# Patient Record
Sex: FEMALE | Race: WHITE
[De-identification: names, ages, dates, MRNs, and addresses within clinical notes are randomized per-mention and may not be internally consistent; named-entity substitution may affect disease eponyms.]

---

## 2018-05-15 ENCOUNTER — HOSPITAL ENCOUNTER (INPATIENT)
Dept: HOSPITAL 36 - ER | Age: 58
LOS: 27 days | Discharge: NURSING, REHAB OR LONG-TERM CARE | DRG: 885 | End: 2018-06-11
Attending: PSYCHIATRY & NEUROLOGY | Admitting: PSYCHIATRY & NEUROLOGY
Payer: MEDICARE

## 2018-05-15 VITALS — DIASTOLIC BLOOD PRESSURE: 52 MMHG | SYSTOLIC BLOOD PRESSURE: 136 MMHG

## 2018-05-15 DIAGNOSIS — E78.5: ICD-10-CM

## 2018-05-15 DIAGNOSIS — B37.9: ICD-10-CM

## 2018-05-15 DIAGNOSIS — F29: ICD-10-CM

## 2018-05-15 DIAGNOSIS — F25.0: Primary | ICD-10-CM

## 2018-05-15 DIAGNOSIS — E83.42: ICD-10-CM

## 2018-05-15 DIAGNOSIS — E88.81: ICD-10-CM

## 2018-05-15 DIAGNOSIS — R73.9: ICD-10-CM

## 2018-05-15 DIAGNOSIS — I12.0: ICD-10-CM

## 2018-05-15 DIAGNOSIS — I25.10: ICD-10-CM

## 2018-05-15 DIAGNOSIS — N18.6: ICD-10-CM

## 2018-05-15 DIAGNOSIS — E66.9: ICD-10-CM

## 2018-05-15 DIAGNOSIS — M19.90: ICD-10-CM

## 2018-05-15 LAB
ALBUMIN SERPL-MCNC: 3.2 GM/DL (ref 3.7–5.3)
ALBUMIN/GLOB SERPL: 1.2 {RATIO} (ref 1–1.8)
ALP SERPL-CCNC: 57 U/L (ref 34–104)
ALT SERPL-CCNC: 21 U/L (ref 7–52)
AMPHET UR-MCNC: NEGATIVE NG/ML
ANION GAP SERPL CALC-SCNC: 8.5 MMOL/L (ref 7–16)
APAP SERPL-MCNC: < 10 UG/ML (ref 10–30)
APPEARANCE UR: (no result)
AST SERPL-CCNC: 22 U/L (ref 13–39)
BACTERIA #/AREA URNS HPF: (no result) /HPF
BARBITURATES UR-MCNC: NEGATIVE UG/ML
BASOPHILS # BLD AUTO: 0 TH/CUMM (ref 0–0.2)
BASOPHILS NFR BLD AUTO: 0.3 % (ref 0–2)
BENZODIAZEPINES PNL UR: POSITIVE
BILIRUB SERPL-MCNC: 0.6 MG/DL (ref 0.3–1)
BILIRUB UR-MCNC: NEGATIVE MG/DL
BUN SERPL-MCNC: 12 MG/DL (ref 7–25)
CALCIUM SERPL-MCNC: 9 MG/DL (ref 8.6–10.3)
CANNABINOIDS SERPL QL CFM: NEGATIVE
CHLORIDE SERPL-SCNC: 101 MEQ/L (ref 98–107)
CHOLEST SERPL-MCNC: 122 MG/DL (ref ?–200)
CO2 SERPL-SCNC: 30.1 MEQ/L (ref 21–31)
COCAINE METAB.OTHER UR-MCNC: NEGATIVE NG/ML
COLOR UR: YELLOW
CREAT SERPL-MCNC: 0.5 MG/DL (ref 0.6–1.2)
EOSINOPHIL # BLD AUTO: 0.2 TH/CMM (ref 0.1–0.4)
EOSINOPHIL NFR BLD AUTO: 3.5 % (ref 0–5)
EPI CELLS URNS QL MICRO: (no result) /LPF
ERYTHROCYTE [DISTWIDTH] IN BLOOD BY AUTOMATED COUNT: 15 % (ref 11.5–20)
GLOBULIN SER-MCNC: 2.6 GM/DL
GLUCOSE SERPL-MCNC: 118 MG/DL (ref 70–105)
GLUCOSE UR STRIP-MCNC: NEGATIVE MG/DL
HBA1C MFR BLD: 5.4 % (ref 4–6)
HCG SERPL QL: NEGATIVE
HCT VFR BLD CALC: 39.5 % (ref 41–60)
HDLC SERPL-MCNC: 52 MG/DL (ref 23–92)
HGB BLD-MCNC: 13.3 GM/DL (ref 12–16)
HGB BLD-MCNC: 15 G/DL (ref 4–35)
KETONES UR STRIP-MCNC: NEGATIVE MG/DL
LEUKOCYTE ESTERASE UR-ACNC: NEGATIVE
LYMPHOCYTE AB SER FC-ACNC: 1.5 TH/CMM (ref 1.5–3)
LYMPHOCYTES NFR BLD AUTO: 24.1 % (ref 20–50)
MCH RBC QN AUTO: 31.4 PG (ref 27–31)
MCHC RBC AUTO-ENTMCNC: 33.7 PG (ref 28–36)
MCV RBC AUTO: 93 FL (ref 81–100)
METHADONE UR CFM-MCNC: NEGATIVE NG/ML
METHAMPHET UR QL: NEGATIVE
MICRO URNS: YES
MONOCYTES # BLD AUTO: 0.9 TH/CMM (ref 0.3–1)
MONOCYTES NFR BLD AUTO: 13.5 % (ref 2–10)
NEUTROPHILS # BLD: 3.7 TH/CMM (ref 1.8–8)
NEUTROPHILS NFR BLD AUTO: 58.6 % (ref 40–80)
NITRITE UR QL STRIP: NEGATIVE
OPIATES UR QL: NEGATIVE
PCP UR-MCNC: NEGATIVE UG/L
PH UR STRIP: 6 [PH] (ref 4.6–8)
PLATELET # BLD: 209 TH/CMM (ref 150–400)
PMV BLD AUTO: 8 FL
POTASSIUM SERPL-SCNC: 3.6 MEQ/L (ref 3.5–5.1)
PROT UR STRIP-MCNC: NEGATIVE MG/DL
RBC # BLD AUTO: 4.25 MIL/CMM (ref 3.8–5.1)
RBC # UR STRIP: NEGATIVE /UL
RBC #/AREA URNS HPF: (no result) /HPF (ref 0–5)
SALICYLATES SERPL-MCNC: < 25 MG/L (ref 30–100)
SODIUM SERPL-SCNC: 136 MEQ/L (ref 136–145)
SP GR UR STRIP: 1.02 (ref 1–1.03)
TRICYCLICS UR QL: NEGATIVE
TRIGL SERPL-MCNC: 82 MG/DL (ref ?–150)
URINALYSIS COMPLETE PNL UR: (no result)
UROBILINOGEN UR STRIP-ACNC: 1 E.U./DL (ref 0.2–1)
WBC # BLD AUTO: 6.3 TH/CMM (ref 4.8–10.8)
WBC #/AREA URNS HPF: (no result) /HPF (ref 0–5)

## 2018-05-15 PROCEDURE — G0410 GRP PSYCH PARTIAL HOSP 45-50: HCPCS

## 2018-05-15 PROCEDURE — Z7610: HCPCS

## 2018-05-15 NOTE — ED PHYSICIAN CHART
ED Chief Complaint/HPI





- Patient Information


Date Seen:: 05/15/18


Time Seen:: 14:10


Chief Complaint:: Agitation


History of Present Illness:: 





onset x 3 days of agitation and aggressive behavior; no report of trauma, SIs, 

AMS, H/As, S/T, neck pain, C/P, SOB, Abd. Pain, A/N/V/D/C, fever, chills, or 

urinary s/s


Allergies:: 


 Allergies











Allergy/AdvReac Type Severity Reaction Status Date / Time


 


lorazepam Allergy   Verified 05/15/18 14:09











Historian:: Patient, EMS


Review:: Nurse's Note Reviewed, Old Chart Reviewed, EMS run form Reviewed





ED Review of Systems





- Review of Systems


General/Constitutional: No fever, No chills, No weight loss, No weakness, No 

diaphoresis, No edema, No loss of appetite


Skin: No skin lesions, No rash, No bruising


Head: No headache, No light-headedness


Eyes: No loss of vision, No pain, No diplopia


ENT: No earache, No nasal drainage, No sore throat, No tinnitus


Neck: No neck pain, No swelling, No thyromegaly, No stiffness, No mass noted


Cardio Vascular: No chest pain, No palpitations, No PND, No orthopnea, No edema


Pulmonary: No SOB, No cough, No sputum, No wheezing


GI: No nausea, No vomiting, No diarrhea, No pain, No melena, No hematochezia, 

No constipation, No hematemesis


G/U: No dysuria, No frequency, No hematuria, No nacturia


Ob/Gyn: No vaginal discharge, No abnormal vaginal bleed, No contraction


Musculoskeletal: No bone or joint pain, No back pain, No muscle pain


Endocrine: No polyuria, No polydipsia


Psychiatric: Prior psych history, No depression, Anxiety, No suicidal ideation, 

No homicidal ideation, Auditory hallucination, No visual hallucination


Hematopoietic: No bruising, No lymphadenopathy


Allergic/Immuno: No urticaria, No angioedema


Neurological: No syncope, No focal symptoms, No weakness, No paresthesia, No 

headache, No seizure, No dizziness, No confusion, No vertigo





ED Past Medical History





- Past Medical History


Obtainable: Yes


Past Medical History: HTN, CAD, Dyslipidemia, ESRD, Arthritis


Family History: HTN


Social History: Non Smoker, No Alcohol, No Drug Use, Single, Care Facility


Surgical History: None


Psychiatricy History: Schizophrenia, Bipolar


Medication: Reviewed





Family Medical History





- Family Member


  ** Mother


History Unknown: Yes





ED Physical Exam





- Physical Examination


General/Constitutional: Awake, Well-developed, well-nourished, Alert, No 

distress, GCS 15, Non-toxic appearing, Ambulatory


Head: Atraumatic


Eyes: Lids, conjuctiva normal, PERRL, EOMI


Skin: Nl inspection, No rash, No skin lesions, No ecchymosis, Well hydrated, No 

lymphadenopathy


ENMT: External ears, nose nl, TM canals nl, Nasal exam nl, Lips, teeth, gums nl

, Oropharynx nl, Tonsils nl


Neck: Nontender, Full ROM w/o pain, No JVD, No nuchal rigidity, No bruit, No 

mass, No stridor


Respiratory: Nl effort/Exclusion, Clear to Auscultation, No Wheeze/Rhonchi/Rales


Cardio Vascular: No murmur, gallop, rubs, NL S1 S2, Carotid/Femoral/Distal 

pulses equal bilaterally


Other Cardio Vascular comments:: 





Irregular Irregular Rhythm


GI: No tenderness/rebounding/guarding, No organomegaly, No hernia, Normal BS's, 

Nondistended, No mass/bruits, No McBurney tenderness


: No CVA tenderness


Extremities: No tenderness or effusion, Full ROM, normal strength in all 

extremities, No edema, Normal digits & nails


Neuro/Psych: Alert/oriented, DTR's symmetric, Normal sensory exam, Normal motor 

strength, Judgement/insight normal, Mood normal, Normal gait, No focal deficits


Misc: Normal back, No paraspinal tenderness





ED Labs/Radiology/EKG Results





- Lab Results


Comments:: 





unremarkable





- EKG Interpretations


EKG Time:: 14:32


Rate & Rhythm: 72; Atrial Flutter/Fibrillation


Comments:: 





RBBB; non-specific st-t changes





ED Septic Shock





- .


Is Septic Shock (SBP<90, OR Lactate>4 mmol\L) present?: No





ED Reassessment (Disposition)





- Reassessment


Reassessment Condition:: Improved





- Diagnosis


Diagnosis:: 





Dx: Agitation; Medical Clearance; Psychosis; Bipolar Disorder





- Aftercare/Follow up Instructions


Aftercare/Follow-Up Instructions:: Counseled pt regarding lab results/diagnosis 

& need follow up, Counseled pt & family regarding lab results/diagnosis & need 

follow up





- Patient Disposition


Discharge/Transfer:: Acute Care w/in this hosp


Admitted to:: [facility]


Condition at Disposition:: Stable, Improved

## 2018-05-16 RX ADMIN — NYSTATIN SCH UNITS: 100000 POWDER TOPICAL at 17:55
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
## 2018-05-17 NOTE — GENERAL PROGRESS NOTE
Subjective





- Review of Systems


Subjective: 





Patient is seen and examined.


No new events.





Objective





- Results


Result Diagrams: 


 05/15/18 14:43





 05/15/18 14:43


Recent Labs: 


 Laboratory Last Values











WBC  6.3 Th/cmm (4.8-10.8)   05/15/18  14:43    


 


RBC  4.25 Mil/cmm (3.80-5.10)   05/15/18  14:43    


 


Hgb  13.3 gm/dL (12-16)   05/15/18  14:43    


 


Hct  39.5 % (41.0-60)  L  05/15/18  14:43    


 


MCV  93.0 fl ()   05/15/18  14:43    


 


MCH  31.4 pg (27.0-31.0)  H  05/15/18  14:43    


 


MCHC Differential  33.7 pg (28.0-36.0)   05/15/18  14:43    


 


RDW  15.0 % (11.5-20.0)   05/15/18  14:43    


 


Plt Count  209 Th/cmm (150-400)   05/15/18  14:43    


 


MPV  8.0 fl  05/15/18  14:43    


 


Neutrophils %  58.6 % (40.0-80.0)   05/15/18  14:43    


 


Lymphocytes %  24.1 % (20.0-50.0)   05/15/18  14:43    


 


Monocytes %  13.5 % (2.0-10.0)  H  05/15/18  14:43    


 


Eosinophils %  3.5 % (0.0-5.0)   05/15/18  14:43    


 


Basophils %  0.3 % (0.0-2.0)   05/15/18  14:43    


 


Sodium  136 mEq/L (136-145)   05/15/18  14:43    


 


Potassium  3.6 mEq/L (3.5-5.1)   05/15/18  14:43    


 


Chloride  101 mEq/L ()   05/15/18  14:43    


 


Carbon Dioxide  30.1 mEq/L (21.0-31.0)   05/15/18  14:43    


 


Anion Gap  8.5  (7.0-16.0)   05/15/18  14:43    


 


BUN  12 mg/dL (7-25)   05/15/18  14:43    


 


Creatinine  0.5 mg/dL (0.6-1.2)  L  05/15/18  14:43    


 


Est GFR ( Amer)  > 60.0 ml/min (>90)   05/15/18  14:43    


 


Est GFR (Non-Af Amer)  > 60.0 ml/min  05/15/18  14:43    


 


BUN/Creatinine Ratio  24.0   05/15/18  14:43    


 


Glucose  118 mg/dL ()  H  05/15/18  14:43    


 


Hemoglobin A1c %  5.4 % (4.0-6.0)   05/15/18  14:43    


 


Calcium  9.0 mg/dL (8.6-10.3)   05/15/18  14:43    


 


Total Bilirubin  0.6 mg/dL (0.3-1.0)   05/15/18  14:43    


 


AST  22 U/L (13-39)   05/15/18  14:43    


 


ALT  21 U/L (7-52)   05/15/18  14:43    


 


Alkaline Phosphatase  57 U/L ()   05/15/18  14:43    


 


Total Protein  5.8 gm/dL (6.0-8.3)  L  05/15/18  14:43    


 


Albumin  3.2 gm/dL (3.7-5.3)  L  05/15/18  14:43    


 


Globulin  2.6 gm/dL  05/15/18  14:43    


 


Albumin/Globulin Ratio  1.2  (1.0-1.8)   05/15/18  14:43    


 


Triglycerides  82 mg/dL (<150)   05/15/18  14:43    


 


Cholesterol  122 mg/dL (<200)   05/15/18  14:43    


 


LDL Cholesterol Direct  52 mg/dL ()  L  05/15/18  14:43    


 


HDL Cholesterol  52 mg/dL (23-92)   05/15/18  14:43    


 


TSH  1.25 uIU/ml (0.34-5.60)   05/15/18  14:43    


 


Serum Pregnancy, Qual  NEGATIVE  (NEGATIVE)   05/15/18  14:43    


 


Urine Source  CLEAN C   05/15/18  15:40    


 


Urine Color  YELLOW   05/15/18  15:40    


 


Urine Clarity  SLIGHTLY HAZY  (CLEAR)   05/15/18  15:40    


 


Urine pH  6.0  (4.6 - 8.0)   05/15/18  15:40    


 


Ur Specific Gravity  1.025  (1.005-1.030)   05/15/18  15:40    


 


Urine Protein  NEGATIVE mg/dL (NEGATIVE)   05/15/18  15:40    


 


Urine Glucose (UA)  NEGATIVE mg/dL (NEGATIVE)   05/15/18  15:40    


 


Urine Ketones  NEGATIVE mg/dL (NEGATIVE)   05/15/18  15:40    


 


Urine Blood  NEGATIVE  (NEGATIVE)   05/15/18  15:40    


 


Urine Nitrate  NEGATIVE  (NEGATIVE)   05/15/18  15:40    


 


Urine Bilirubin  NEGATIVE  (NEGATIVE)   05/15/18  15:40    


 


Urine Urobilinogen  1.0 E.U./dL (0.2 - 1.0)   05/15/18  15:40    


 


Ur Leukocyte Esterase  NEGATIVE  (NEGATIVE)   05/15/18  15:40    


 


Urine RBC  0-2 /hpf (0-5)   05/15/18  15:40    


 


Urine WBC  0-2 /hpf (0-5)   05/15/18  15:40    


 


Ur Epithelial Cells  MANY /lpf (FEW)   05/15/18  15:40    


 


Urine Bacteria  NONE SEEN /hpf (NONE SEEN)   05/15/18  15:40    


 


Salicylates  < 25.0 mg/L (30.0-100.0)  L  05/15/18  14:43    


 


Urine Opiates Screen  NEGATIVE  (NEGATIVE)   05/15/18  15:40    


 


Urine Methadone Screen  NEGATIVE  (NEGATIVE)   05/15/18  15:40    


 


Acetaminophen  < 10.0 ug/mL (10.0-30.0)  L  05/15/18  14:43    


 


Ur Barbiturates Screen  NEGATIVE  (NEGATIVE)   05/15/18  15:40    


 


Valproic Acid  15.1 ug/mL (50.0-100.0)  L  05/16/18  09:20    


 


Ur Tricyclics Screen  NEGATIVE  (NEGATIVE)   05/15/18  15:40    


 


Ur Phencyclidine Scrn  NEGATIVE  (NEGATIVE)   05/15/18  15:40    


 


Amphetamines Screen  NEGATIVE  (NEGATIVE)   05/15/18  15:40    


 


U Methamphetamines Scrn  NEGATIVE  (NEGATIVE)   05/15/18  15:40    


 


U Benzodiazepines Scrn  POSITIVE  (NEGATIVE)  H  05/15/18  15:40    


 


U Cocaine Metab Screen  NEGATIVE  (NEGATIVE)   05/15/18  15:40    


 


U Cannabinoids Screen  NEGATIVE  (NEGATIVE)   05/15/18  15:40    


 


Ethyl Alcohol  < 10 mg/dL (0-10)   05/15/18  14:43    














- Physical Exam


Vitals and I&O: 


 Vital Signs











Temp  97.5 F   05/16/18 15:55


 


Pulse  79   05/17/18 08:41


 


Resp  20   05/16/18 20:00


 


BP  155/77   05/17/18 08:41


 


Pulse Ox  95   05/16/18 15:55








 Intake & Output











 05/16/18 05/17/18 05/17/18





 18:59 06:59 18:59


 


Other:   


 


  Stool Characteristics Formed Formed 





 Brown Brown 











Active Medications: 


Current Medications





Acetaminophen (Tylenol)  650 mg PO Q4HR PRN


   PRN Reason: Mild Pain / Temp above 100


   Stop: 07/14/18 16:41


   Last Admin: 05/16/18 09:56 Dose:  650 mg


Al Hydrox/Mg Hydrox/Simethicone (Maalox)  30 ml PO Q4HR PRN


   PRN Reason: GI DISTRESS


   Stop: 07/14/18 16:41


Carvedilol (Coreg)  6.25 mg PO BID [facility]


   Stop: 07/14/18 16:59


   Last Admin: 05/17/18 08:41 Dose:  6.25 mg


Docusate Sodium (Colace)  100 mg PO DAILY [facility]


   Stop: 07/15/18 08:59


   Last Admin: 05/17/18 08:40 Dose:  100 mg


Furosemide (Lasix)  20 mg PO DAILY [facility]


   Stop: 07/15/18 08:59


   Last Admin: 05/17/18 08:40 Dose:  20 mg


Lactulose (Cephulac)  10 gm PO DAILY PRN


   PRN Reason: Constipation


   Stop: 07/14/18 16:59


Magnesium Hydroxide (Milk Of Magnesia)  30 ml PO HS PRN


   PRN Reason: Constipation


Miscellaneous (Paliperidone Palmitate [Invega Sustenna])  234 mg PO [id number] [facility]


   Stop: 07/14/18 16:44


Multivitamins/Vitamin C (Theragran)  1 tab PO DAILY [facility]


   Stop: 07/15/18 08:59


   Last Admin: 05/17/18 08:41 Dose:  1 tab


Nystatin (Nystop)  100,000 units TP BID [facility]


   Stop: 07/15/18 16:59


   Last Admin: 05/16/18 17:55 Dose:  100,000 units


Quetiapine Fumarate (Seroquel)  50 mg PO BID [facility]


   PRN Reason: Protocol


   Stop: 07/16/18 08:59


   Last Admin: 05/17/18 08:41 Dose:  50 mg


Quetiapine Fumarate (Seroquel)  100 mg PO HS [facility]


   PRN Reason: Protocol


   Stop: 07/16/18 20:59


Valproate Sodium (Depakene)  500 mg PO BID [facility]


   Stop: 07/16/18 06:42


   Last Admin: 05/17/18 08:41 Dose:  500 mg


Zolpidem Tartrate (Ambien)  5 mg PO HS PRN


   PRN Reason: Insomnia


   Stop: 07/14/18 16:41


   Last Admin: 05/16/18 01:24 Dose:  5 mg








General: Alert, No acute distress


HEENT: Atraumatic, PERRLA, EOMI


Neck: Supple, JVD


Cardiovascular: Regular rate, Normal S1, Normal S2


Lungs: Clear to auscultation


Abdomen: Bowel sounds, Soft


Extremities: Other (No edema+)


Skin: Other (Erythematous rash under the breast.)


Psych/Mental Status: Other (labile.)





Assessment/Plan





- Assessment


Assessment: 





Cutaneous candidiasis


Hypertension.


CAD


Hyperlipedemia.


Psych disorder


DJD


Fall risk.





- Plan


Plan: 





Nystatin powder


Monitor vitals and lab


Monitor behavior


Fall precautions.


general nursing care


Psych meds


Psych follow up.


Symptoms management.


Continue current care.
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
## 2018-05-17 NOTE — PROGRESS NOTES
DATE:  



SUBJECTIVE:  Chart reviewed and the patient interviewed.  Also discussed the

patient's condition with the staff and reviewed records and labs.  The patient

continued to be extremely agitated and in angry and irritable mood.  The patient

has been yelling and screaming, and verbally abusive to staff.  The patient also

has been taking off her clothes and yelling at staff to come and help her out. 

She also did not sleep last night, almost all night.  Also, during interview,

the patient kept yelling and screaming and asking for "help."  She also has

been having severe mood swings and she was not able to carry on coherent

conversation.



ASSESSMENT:  The patient is still severely psychotic and agitated.



TREATMENT PLAN:  Continue to monitor her behavior and her condition closely. 

Also, we will start the patient on Seroquel 50 mg twice a day and 100 mg at

bedtime.  Also, we will increase Depakote to 500 mg twice a day and will

continue to follow up closely.





DD: 05/17/2018 07:09

DT: 05/17/2018 21:24

[facility]# [id number]  [id number]

## 2018-05-18 RX ADMIN — NYSTATIN SCH: 100000 POWDER TOPICAL at 08:35

## 2018-05-18 RX ADMIN — NYSTATIN SCH: 100000 POWDER TOPICAL at 17:19

## 2018-05-18 NOTE — GENERAL PROGRESS NOTE
Subjective





- Review of Systems


Subjective: 





Patient is seen and examined.


No new events.


Discussed with staff Re; their concerns.





Objective





- Results


Result Diagrams: 


 05/15/18 14:43





 05/15/18 14:43


Recent Labs: 


 Laboratory Last Values











WBC  6.3 Th/cmm (4.8-10.8)   05/15/18  14:43    


 


RBC  4.25 Mil/cmm (3.80-5.10)   05/15/18  14:43    


 


Hgb  13.3 gm/dL (12-16)   05/15/18  14:43    


 


Hct  39.5 % (41.0-60)  L  05/15/18  14:43    


 


MCV  93.0 fl ()   05/15/18  14:43    


 


MCH  31.4 pg (27.0-31.0)  H  05/15/18  14:43    


 


MCHC Differential  33.7 pg (28.0-36.0)   05/15/18  14:43    


 


RDW  15.0 % (11.5-20.0)   05/15/18  14:43    


 


Plt Count  209 Th/cmm (150-400)   05/15/18  14:43    


 


MPV  8.0 fl  05/15/18  14:43    


 


Neutrophils %  58.6 % (40.0-80.0)   05/15/18  14:43    


 


Lymphocytes %  24.1 % (20.0-50.0)   05/15/18  14:43    


 


Monocytes %  13.5 % (2.0-10.0)  H  05/15/18  14:43    


 


Eosinophils %  3.5 % (0.0-5.0)   05/15/18  14:43    


 


Basophils %  0.3 % (0.0-2.0)   05/15/18  14:43    


 


Sodium  136 mEq/L (136-145)   05/15/18  14:43    


 


Potassium  3.6 mEq/L (3.5-5.1)   05/15/18  14:43    


 


Chloride  101 mEq/L ()   05/15/18  14:43    


 


Carbon Dioxide  30.1 mEq/L (21.0-31.0)   05/15/18  14:43    


 


Anion Gap  8.5  (7.0-16.0)   05/15/18  14:43    


 


BUN  12 mg/dL (7-25)   05/15/18  14:43    


 


Creatinine  0.5 mg/dL (0.6-1.2)  L  05/15/18  14:43    


 


Est GFR ( Amer)  > 60.0 ml/min (>90)   05/15/18  14:43    


 


Est GFR (Non-Af Amer)  > 60.0 ml/min  05/15/18  14:43    


 


BUN/Creatinine Ratio  24.0   05/15/18  14:43    


 


Glucose  118 mg/dL ()  H  05/15/18  14:43    


 


Hemoglobin A1c %  5.4 % (4.0-6.0)   05/15/18  14:43    


 


Calcium  9.0 mg/dL (8.6-10.3)   05/15/18  14:43    


 


Total Bilirubin  0.6 mg/dL (0.3-1.0)   05/15/18  14:43    


 


AST  22 U/L (13-39)   05/15/18  14:43    


 


ALT  21 U/L (7-52)   05/15/18  14:43    


 


Alkaline Phosphatase  57 U/L ()   05/15/18  14:43    


 


Total Protein  5.8 gm/dL (6.0-8.3)  L  05/15/18  14:43    


 


Albumin  3.2 gm/dL (3.7-5.3)  L  05/15/18  14:43    


 


Globulin  2.6 gm/dL  05/15/18  14:43    


 


Albumin/Globulin Ratio  1.2  (1.0-1.8)   05/15/18  14:43    


 


Triglycerides  82 mg/dL (<150)   05/15/18  14:43    


 


Cholesterol  122 mg/dL (<200)   05/15/18  14:43    


 


LDL Cholesterol Direct  52 mg/dL ()  L  05/15/18  14:43    


 


HDL Cholesterol  52 mg/dL (23-92)   05/15/18  14:43    


 


TSH  1.25 uIU/ml (0.34-5.60)   05/15/18  14:43    


 


Serum Pregnancy, Qual  NEGATIVE  (NEGATIVE)   05/15/18  14:43    


 


Urine Source  CLEAN C   05/15/18  15:40    


 


Urine Color  YELLOW   05/15/18  15:40    


 


Urine Clarity  SLIGHTLY HAZY  (CLEAR)   05/15/18  15:40    


 


Urine pH  6.0  (4.6 - 8.0)   05/15/18  15:40    


 


Ur Specific Gravity  1.025  (1.005-1.030)   05/15/18  15:40    


 


Urine Protein  NEGATIVE mg/dL (NEGATIVE)   05/15/18  15:40    


 


Urine Glucose (UA)  NEGATIVE mg/dL (NEGATIVE)   05/15/18  15:40    


 


Urine Ketones  NEGATIVE mg/dL (NEGATIVE)   05/15/18  15:40    


 


Urine Blood  NEGATIVE  (NEGATIVE)   05/15/18  15:40    


 


Urine Nitrate  NEGATIVE  (NEGATIVE)   05/15/18  15:40    


 


Urine Bilirubin  NEGATIVE  (NEGATIVE)   05/15/18  15:40    


 


Urine Urobilinogen  1.0 E.U./dL (0.2 - 1.0)   05/15/18  15:40    


 


Ur Leukocyte Esterase  NEGATIVE  (NEGATIVE)   05/15/18  15:40    


 


Urine RBC  0-2 /hpf (0-5)   05/15/18  15:40    


 


Urine WBC  0-2 /hpf (0-5)   05/15/18  15:40    


 


Ur Epithelial Cells  MANY /lpf (FEW)   05/15/18  15:40    


 


Urine Bacteria  NONE SEEN /hpf (NONE SEEN)   05/15/18  15:40    


 


Salicylates  < 25.0 mg/L (30.0-100.0)  L  05/15/18  14:43    


 


Urine Opiates Screen  NEGATIVE  (NEGATIVE)   05/15/18  15:40    


 


Urine Methadone Screen  NEGATIVE  (NEGATIVE)   05/15/18  15:40    


 


Acetaminophen  < 10.0 ug/mL (10.0-30.0)  L  05/15/18  14:43    


 


Ur Barbiturates Screen  NEGATIVE  (NEGATIVE)   05/15/18  15:40    


 


Valproic Acid  15.1 ug/mL (50.0-100.0)  L  05/16/18  09:20    


 


Ur Tricyclics Screen  NEGATIVE  (NEGATIVE)   05/15/18  15:40    


 


Ur Phencyclidine Scrn  NEGATIVE  (NEGATIVE)   05/15/18  15:40    


 


Amphetamines Screen  NEGATIVE  (NEGATIVE)   05/15/18  15:40    


 


U Methamphetamines Scrn  NEGATIVE  (NEGATIVE)   05/15/18  15:40    


 


U Benzodiazepines Scrn  POSITIVE  (NEGATIVE)  H  05/15/18  15:40    


 


U Cocaine Metab Screen  NEGATIVE  (NEGATIVE)   05/15/18  15:40    


 


U Cannabinoids Screen  NEGATIVE  (NEGATIVE)   05/15/18  15:40    


 


Ethyl Alcohol  < 10 mg/dL (0-10)   05/15/18  14:43    


 


RPR  NONREACTIVE  (NONREACTIVE)   05/15/18  14:43    














- Physical Exam


Vitals and I&O: 


 Vital Signs











Temp  98.6 F   05/17/18 20:00


 


Pulse  80   05/18/18 08:37


 


Resp  20   05/17/18 20:00


 


BP  142/79   05/18/18 08:38


 


Pulse Ox  98   05/17/18 20:00








 Intake & Output











 05/17/18 05/18/18 05/18/18





 18:59 06:59 18:59


 


Intake Total  360 


 


Balance  360 


 


Intake:   


 


  Oral  360 


 


Other:   


 


  # Voids  1 











Active Medications: 


Current Medications





Acetaminophen (Tylenol)  650 mg PO Q4HR PRN


   PRN Reason: Mild Pain / Temp above 100


   Stop: 07/14/18 16:41


   Last Admin: 05/16/18 09:56 Dose:  650 mg


Al Hydrox/Mg Hydrox/Simethicone (Maalox)  30 ml PO Q4HR PRN


   PRN Reason: GI DISTRESS


   Stop: 07/14/18 16:41


Carvedilol (Coreg)  6.25 mg PO BID [facility]


   Stop: 07/14/18 16:59


   Last Admin: 05/18/18 08:37 Dose:  6.25 mg


Docusate Sodium (Colace)  100 mg PO DAILY [facility]


   Stop: 07/15/18 08:59


   Last Admin: 05/18/18 08:37 Dose:  100 mg


Furosemide (Lasix)  20 mg PO DAILY [facility]


   Stop: 07/15/18 08:59


   Last Admin: 05/18/18 08:38 Dose:  20 mg


Hydroxyzine Pamoate (Vistaril)  25 mg PO Q4HR PRN; Protocol


   PRN Reason: Agitation


   Stop: 07/17/18 07:45


Lactulose (Cephulac)  10 gm PO DAILY PRN


   PRN Reason: Constipation


   Stop: 07/14/18 16:59


Magnesium Hydroxide (Milk Of Magnesia)  30 ml PO HS PRN


   PRN Reason: Constipation


Miscellaneous (Paliperidone Palmitate [Invega Sustenna])  234 mg PO [id number] [facility]


   Stop: 07/14/18 16:44


Multivitamins/Vitamin C (Theragran)  1 tab PO DAILY [facility]


   Stop: 07/15/18 08:59


   Last Admin: 05/18/18 08:39 Dose:  1 tab


Nystatin (Nystop)  100,000 units TP BID [facility]


   Stop: 07/15/18 16:59


   Last Admin: 05/18/18 08:35 Dose:  Not Given


Quetiapine Fumarate (Seroquel)  50 mg PO BID [facility]


   PRN Reason: Protocol


   Stop: 07/16/18 08:59


   Last Admin: 05/18/18 08:37 Dose:  50 mg


Quetiapine Fumarate (Seroquel)  200 mg PO HS [facility]


   PRN Reason: Protocol


   Stop: 07/17/18 06:43


Valproate Sodium (Depakene)  500 mg PO BID [facility]


   Stop: 07/16/18 06:42


   Last Admin: 05/18/18 08:35 Dose:  500 mg


Zolpidem Tartrate (Ambien)  5 mg PO HS PRN


   PRN Reason: Insomnia


   Stop: 07/14/18 16:41


   Last Admin: 05/17/18 23:05 Dose:  5 mg








General: Alert, No acute distress


HEENT: Atraumatic, PERRLA, EOMI


Neck: Supple, JVD


Cardiovascular: Regular rate, Normal S1, Normal S2


Lungs: Clear to auscultation


Abdomen: Bowel sounds, Soft


Extremities: Other (No edema+)


Skin: Other (Erythematous rash under the breast.)


Psych/Mental Status: Other (labile.)





Assessment/Plan





- Assessment


Assessment: 





Cutaneous candidiasis.


Hypertension.


CAD.


Hyperlipedemia.


Psych disorder.


DJD.


Fall risk.





- Plan


Plan: 





Nystatin powder under the breast area tid.


Monitor vitals.


Monitor behavior.


Fall precautions.


general nursing care


Psych meds as per psych.


Psych follow up.


Symptoms management.


Continue current care.


Discussed with staff.

## 2018-05-18 NOTE — PROGRESS NOTES
DATE:  



SUBJECTIVE:  Chart reviewed.  The patient interviewed.  Also discussed the

patient's condition with the staff and reviewed records and labs.  The patient

continued to be extremely agitated and is extremely irritable.  The patient

continued to yell and scream and is still demanding.  Also, the patient after

staff changing her diapers today, she took off her diaper and asking staff to

change her again, and in angry and irritable mood.  The patient also is still

suspicious and still needs lots of redirections with difficulty following

directions.



ASSESSMENT:  The patient is still agitated and is still severely psychotic.



TREATMENT PLAN:  Continue to monitor her behavior and her condition closely. 

Also, we will increase Seroquel to 50 mg twice a day and 200 mg at bedtime and

we will continue to follow closely because of her irritability and agitation.





DD: 05/18/2018 07:34

DT: 05/18/2018 22:06

[facility]# [id number]  [id number]

## 2018-05-19 RX ADMIN — NYSTATIN SCH: 100000 POWDER TOPICAL at 16:10

## 2018-05-19 RX ADMIN — NYSTATIN SCH: 100000 POWDER TOPICAL at 08:04

## 2018-05-20 RX ADMIN — NYSTATIN SCH: 100000 POWDER TOPICAL at 10:53

## 2018-05-20 RX ADMIN — NYSTATIN SCH UNITS: 100000 POWDER TOPICAL at 08:38

## 2018-05-20 RX ADMIN — NYSTATIN SCH: 100000 POWDER TOPICAL at 16:37

## 2018-05-20 NOTE — PROGRESS NOTES
DATE:  05/20/2018



Case was discussed with staff of the patient, reviewed records.  The patient

continues to have poor insight.  Continues to be hard to redirect.  Continues to

be unable to give much information.  Continues to be easily agitated.  Continues

to have episodes of yelling and screaming, unpredictable and impulsive.  She

still far, compliant with the medication with no side effects, no sedation, no

nausea, no extrapyramidal symptoms.  We will continue to work with the patient

group therapy, milieu therapy, and adjust the medications as needed.





DD: 05/20/2018 12:01

DT: 05/20/2018 21:05

JOB# [id number]  [id number]

## 2018-05-20 NOTE — PROGRESS NOTES
DATE:  05/20/2018



IDENTIFICATION:  A 57-year-old female.



The patient was seen and examined.



PHYSICAL EXAMINATION:

GENERAL:  The patient is lying in the bed.  No new event.

VITAL SIGNS:  Temperature 97, pulse is 84, respiratory 18, blood pressure

140/84.

HEENT:  No facial asymmetry.

NECK:  Supple, no JVD.

HEART:  Regular.

CHEST AND LUNGS:  Equal in expansion.  No wheezing, no crackles.

ABDOMEN:  Soft.  No guarding, rigidity.  Bowel sounds are present.  No palpable

mass.

EXTREMITIES:  No edema.

NEUROLOGIC:  Alert, awake, follows commands.



CLINICAL IMPRESSION:

1.  Hypertension.

2.  Coronary artery disease.

3.  Hyperlipidemia.

4.  Psych disorder.

5.  Degenerative joint disease.



PLAN:

1.  Antihypertensive medicine.

2.  Monitor blood pressure.

3.  Aspirin.

4.  Statin.

5.  Psych medication.

6.  Psych followup.

7.  General nursing care.

8.  Chronic disease management.

9.  Care plan reviewed and discussed with staff.





DD: 05/20/2018 15:16

DT: 05/20/2018 18:26

JOB# [id number]  [id number]

## 2018-05-21 RX ADMIN — NYSTATIN SCH UNITS: 100000 POWDER TOPICAL at 16:32

## 2018-05-21 RX ADMIN — NYSTATIN SCH UNITS: 100000 POWDER TOPICAL at 08:40

## 2018-05-22 RX ADMIN — NYSTATIN SCH UNITS: 100000 POWDER TOPICAL at 08:38

## 2018-05-22 RX ADMIN — NYSTATIN SCH: 100000 POWDER TOPICAL at 17:09

## 2018-05-22 NOTE — PROGRESS NOTES
DATE:  



IDENTIFICATION:  A 57-year-old female.



SUBJECTIVE:  The patient seen and examined.  The patient is lying in the bed. 

The patient is very agitated.  The patient denies any chest pain, short of

breath, palpitation, and dizziness.



PHYSICAL EXAMINATION:

VITAL SIGNS:  Temperature 96, pulse is 95, respiratory rate is 18, blood

pressure 160/90.

HEENT:  No facial asymmetry.

NECK:  Supple, no JVD.

HEART:  Regular.

CHEST AND LUNGS:  Equal in expansion, no wheezing, no crackles.

ABDOMEN:  Soft.  No guarding, rigidity.  Bowel sounds are present.  No palpable

mass.

EXTREMITIES:  No edema.



CLINICAL IMPRESSION:

1.  Hypertension.

2.  Hyperlipidemia.

3.  Coronary artery disease.

4.  Degenerative joint disease.

5.  Psychotic disorder.

6.  Obesity.

7.  High risk for fall.

8.  Cutaneous candidiasis, clinically improving.



PLAN:

1.  Continue current medications for candidiasis.

2.  We will continue to monitor blood pressure and fall precautions,

antihypertensive medicines along with statins.

3.  General nursing care, fall precautions, nutritional support along with a

psychiatric management by psychiatrist.  Care plan reviewed and discussed.





DD: 05/22/2018 09:24

DT: 05/22/2018 17:47

JOB# [id number]  [id number]

## 2018-05-22 NOTE — PROGRESS NOTES
DATE:  



SUBJECTIVE:  Chart reviewed and the patient interviewed.  Also discussed the

patient's condition with the staff and reviewed records and labs.  The patient

is still in irritable and angry mood and be easily agitated.  The patient also

is having episodes of yelling and screaming and the patient is loud.  She also

is verbally abusive to staff, especially when her demands are not met.  The

patient also is still having severe mood swings and severe anxiety.  Otherwise,

the patient is cooperative and compliant with taking her medications.  The

patient also is still delusional and continues to talk about that her

ex-'s friends are in her room in order to harm her.



ASSESSMENT:  The patient is still psychotic and confused.



TREATMENT PLAN:  Continue monitoring her behavior closely.  Also, I increased

the Seroquel yesterday to 100 mg twice a day and 250 mg at bedtime.  We will

continue same dose and we will continue to work on her irritability and continue

to follow up closely.  Also, working with  in regards to discharge

plans and in regards to placement issue for the patient.  So far no place

available for the patient.





DD: 05/22/2018 11:54

DT: 05/22/2018 22:58

JOB# [id number]  [id number]

## 2018-05-22 NOTE — PROGRESS NOTES
DATE:  05/21/2018



Chart reviewed and the patient interviewed.  Also, discussed the patient's

condition with the staff and reviewed records and labs.  The patient was

extremely agitated and irritable yesterday and she was argumentative and

demanding with the staff and she was threatening staff that "I will put you in

detention."  The patient had to be given emergency dose of Haldol, Ativan and

Benadryl in order to calm her down.  The patient is still extremely angry and in

irritable mood.  She is demanding and she is using foul language, thoughts that

_____, banging in doors and she has been very paranoid and she believes that her

ex-'s spirit is in her room to harm her.  The patient also has been

thinking that there is a lady looking at her from the window in her room.  The

patient also continued to threaten the staff and continued to be extremely

irritable and agitated.  Also, during interview her thought processes are

circumstantial and tangential with flight of ideas.



ASSESSMENT:  The patient is still psychotic and still can be dangerous to

others.



TREATMENT PLAN:  We will continue monitoring her behavior and her condition

closely.  Also, we will increase Seroquel to 100 mg twice a day and 250 mg at

bedtime and will continue to follow up.





DD: 05/22/2018 05:54

DT: 05/22/2018 06:53

JOB# [id number]  [id number]

## 2018-05-23 RX ADMIN — NYSTATIN SCH: 100000 POWDER TOPICAL at 08:25

## 2018-05-23 RX ADMIN — NYSTATIN SCH: 100000 POWDER TOPICAL at 17:43

## 2018-05-24 RX ADMIN — NYSTATIN SCH: 100000 POWDER TOPICAL at 17:16

## 2018-05-24 RX ADMIN — NYSTATIN SCH: 100000 POWDER TOPICAL at 09:24

## 2018-05-24 NOTE — PROGRESS NOTES
DATE:  05/23/2018



SUBJECTIVE:  Chart reviewed and the patient interviewed.  Also, discussed the

patient's condition with the staff and reviewed records and labs.  The patient

continued to be extremely irritable and extremely agitated.  The patient also is

still hyperverbal and she is still restless and continued to be intrusive to

staff and to others.  The patient also still needs lots of redirections and the

patient is having difficulty following staff directions.  Otherwise, the patient

is compliant with taking her medications with no side effect of medications. 

The patient is demanding and she is still verbally abusive to staff.



ASSESSMENT:  The patient is still agitated and is still psychotic.



TREATMENT PLAN:  Continue to monitor her behavior and her condition closely. 

Also, we will increase Seroquel to 100 mg twice a day and 300 mg at bedtime and

will continue to follow up closely.





DD: 05/23/2018 22:43

DT: 05/24/2018 01:25

JOB# [id number]  [id number]

## 2018-05-25 RX ADMIN — NYSTATIN SCH: 100000 POWDER TOPICAL at 16:04

## 2018-05-25 RX ADMIN — NYSTATIN SCH: 100000 POWDER TOPICAL at 09:13

## 2018-05-26 RX ADMIN — NYSTATIN SCH: 100000 POWDER TOPICAL at 16:31

## 2018-05-26 RX ADMIN — NYSTATIN SCH: 100000 POWDER TOPICAL at 08:35

## 2018-05-26 NOTE — PROGRESS NOTES
DATE:  05/26/2018



SUBJECTIVE:  The patient was seen and evaluated.  The patient's chart reviewed.



Dr. Clay covering for Dr. Basurto.



IDENTIFYING DATA:  A 57-year-old female was admitted here with a history of

bipolar, extremely agitated and restless and not sleeping.  Overnight nursing

staff reporting that the patient continues to be easily suspicious.  Today on

face-to-face evaluation, the patient stated that the phone reports everything is

fine, but observed to be easily suspicious and reports poor sleep.



MENTAL STATUS EXAMINATION:  Easily suspicious, irritable, easily agitated,

needing a lot of redirection.



ASSESSMENT AND PLAN:  The patient is a 57-year-old female who continues to be

suspicious, paranoid, and irritable, whose Seroquel was recently increased to

100 mg p.o. b.i.d. and 300 mg at nighttime to target the patient's severe

suspicious psychotic behavior.  We will continue monitoring and evaluating and

she has tolerating without any complications or side effects of medications.





DD: 05/26/2018 07:31

DT: 05/26/2018 20:51

[facility]# [id number]  [id number]

## 2018-05-26 NOTE — PROGRESS NOTES
DATE:  05/24/2018



SUBJECTIVE:  Chart reviewed and the patient interviewed.  Also discussed the

patient's condition with the staff and reviewed records and labs.  The patient

is still easily agitated.  The patient also is still hypertalkative and she is

still resisting care.  The patient also is still focused on her smoking.  When

her demands not met, the patient gets agitated and aggressive.  Otherwise, the

patient is compliant with taking her medications with no side effect of

medications.



ASSESSMENT:  The patient is still agitated and in irritable mood.



TREATMENT PLAN:  Continue monitoring her behavior and her condition closely. 

Also, continue to work on her poor impulse control and her demanding and

impulsivity.  Also, adjusting psychotropic medications.





DD: 05/25/2018 20:26

DT: 05/26/2018 11:28

JOB# [id number]  [id number]

## 2018-05-26 NOTE — PROGRESS NOTES
DATE:  



SUBJECTIVE:  Chart reviewed and the patient interviewed.  Also discussed the

patient's condition with the staff and reviewed records and labs.  The patient

is still in irritable and angry mood.  The patient also is still easily agitated

and the patient also is demanding.  The patient also is argumentative and she is

disturbing others.  Otherwise, the patient has continued to comply with taking

her medications with no side effects of medications.



ASSESSMENT:  The patient is still agitated and psychotic.



TREATMENT PLAN:  Continue monitoring her behavior and continue to follow up. 

Also continue adjusting psychotropic medications.  Also, working on discharge

plans.





DD: 05/25/2018 20:48

DT: 05/26/2018 11:37

JOB# [id number]  [id number]

## 2018-05-27 RX ADMIN — NYSTATIN SCH: 100000 POWDER TOPICAL at 09:06

## 2018-05-27 RX ADMIN — NYSTATIN SCH: 100000 POWDER TOPICAL at 17:04

## 2018-05-27 NOTE — PROGRESS NOTES
DATE:  05/26/2018



SUBJECTIVE:  The patient was seen with the .  Covering for Dr. Basurto.



Overnight the patient, nursing staff reported the patient continues to call 911

because she was very paranoid.  Today on face-to-face evaluation, the patient

reports that her mother is trying to kill her ____ feels very distraught and

paranoid.



MENTAL STATUS EXAMINATION:  Paranoid, is irritable, agitated, trying to call

911.



ASSESSMENT AND PLAN:  This is a 57-year-old female with a history of paranoid

schizophrenia.  Seroquel was recently increased to 100 mg p.o. b.i.d. and 300 mg

at nighttime, a total of 500 mg. We will continue with the current medication

regimen that was recently increased, reaching steady state.  We will provide

therapeutic alliance. In the meantime, may consider increasing in the next 24

hours.





DD: 05/27/2018 08:07

DT: 05/27/2018 20:16

JOB# [id number]  [id number]

## 2018-05-28 RX ADMIN — NYSTATIN SCH: 100000 POWDER TOPICAL at 17:21

## 2018-05-28 RX ADMIN — NYSTATIN SCH: 100000 POWDER TOPICAL at 08:32

## 2018-05-28 NOTE — PROGRESS NOTES
DATE:  05/28/2018



SUBJECTIVE:  The patient was seen and evaluated.  The patient's chart reviewed.



This is Dr. Clay covering for Dr. Basurto.



Today on face-to-face evaluation, the patient is extremely irritable, agitated,

reported that she needs her medications immediately.  She feels like her family

is trying to kill her .



MENTAL STATUS EXAMINATION:  Paranoid delusions.



ASSESSMENT AND PLAN:  The patient is a 57-year-old female, who presents very

psychotic, disorganized.  We will continue with the current medication regimen. 

She continues to be in steady state.  She continues to believe her family _____,

but her own mother is very delusional.  We will continue increasing Seroquel to

125 b.i.d. to target the patient's active psychotic symptoms.





DD: 05/28/2018 07:34

DT: 05/28/2018 19:57

[facility]# [id number]  [id number]

## 2018-05-29 RX ADMIN — NYSTATIN SCH: 100000 POWDER TOPICAL at 09:31

## 2018-05-29 RX ADMIN — NYSTATIN SCH: 100000 POWDER TOPICAL at 16:38

## 2018-05-30 RX ADMIN — NYSTATIN SCH: 100000 POWDER TOPICAL at 08:59

## 2018-05-30 NOTE — PROGRESS NOTES
DATE:  05/29/2018



SUBJECTIVE:  The patient is currently in the hospital, history of bipolar. 

History of agitation and restless for the past few days, not following any

directions.  The patient is seen by Dr. Clay over the past few days.  The

patient states she is here for "medication changes."  Describes her mood is"

hungry."  Noted to be irritable, highly impulsive, unpredictable, still with

mood swings, mood lability.  The patient is mostly withdrawn and isolative.  The

patient complains of poor sleep.



ASSESSMENT:  The patient remains suspicious, paranoid, withdrawn, impulsive,

unpredictable, still at times with agitation.



PLAN:  We will continue to monitor.  We will continue to adjust and titrate

medications.  Given recent dose changes we will continue with current dose. 

Recommend checking a Depakote level and adjusting the medications as tolerated. 

Medications were reviewed.  The patient is not safe for a lower level of care.





DD: 05/29/2018 17:48

DT: 05/30/2018 00:02

[facility]# [id number]  [id number]

## 2018-05-31 NOTE — PROGRESS NOTES
DATE:  



IDENTIFICATION:  A 57-year-old female.



SUBJECTIVE:  The patient is seen and examined.  The patient lying in the bed, no

new event.  The patient continued to remain unpredictable.  The patient

discussed with nursing staff, no new event reported.



PHYSICAL EXAMINATION:

VITAL SIGNS:  See nurse's note.

HEENT:  No facial asymmetry.

NECK:  Supple, no JVD.

HEART:  Regular.

CHEST:  Lung equal in expansion.  No wheezing, no crackles.

ABDOMEN:  Soft, no guarding or rigidity.  Bowel sounds present.  No palpable

mass.

EXTREMITIES:  No edema.

NEUROLOGIC:  Alert, awake, follows commands.



Available MAR reviewed.



CLINICAL IMPRESSION:

1.  Coronary artery disease.

2.  Hypertension.

3.  Degenerative joint disease.

4.  Obesity.

5.  Psychiatric disorder.



PLAN:

1.  Aspirin.

2.  Beta blocker.

3.  Statin.

4.  General nursing care.

5.  Psych medication.

6.  Psych followup.

7.  Fall precautions.

8.  Nutritional support.

9.  Care plan reviewed and discussed.





DD: 05/30/2018 19:24

DT: 05/31/2018 10:03

JOB# [id number]  [id number]

## 2018-05-31 NOTE — PROGRESS NOTES
DATE:  



SUBJECTIVE:  Chart reviewed and the patient interviewed.  Also discussed the

patient's condition with the staff and reviewed records and labs.  The patient

is demanding and she is still easily agitated and in irritable mood.  The

patient also is still aggressive with the staff and she is still going around

the unit with sometimes wheelchair and sometimes walking.  She also is still

impulsive and intrusive and has unpredictable behavior.



ASSESSMENT:  The patient is still psychotic and agitated and can be dangerous to

others.



TREATMENT PLAN:  Continue adjusting psychotropic medications and continue to

monitor her behavior closely.





DD: 05/30/2018 21:35

DT: 05/31/2018 13:10

[facility]# [id number]  [id number]

## 2018-06-02 NOTE — PROGRESS NOTES
DATE:  06/02/2018



SUBJECTIVE:  The patient is currently in the hospital, history of bipolar, very

agitated, restless, apparently aggressive at some points, trying to hit other

people.  Dr. Basurto seeing the patient over the past few days.  He remained

confused, manic at times, yelling, screaming, remains impulsive and

unpredictable.  The patient slept for about 4 hours, noted to be intrusive on

exam, grandiose on exam, easily agitated.



ASSESSMENT:  The patient remains symptomatic, grandiose, talking about

nonsensical topics.  Medications were noted.



PLAN:  We will continue to monitor and follow up.  The patient is not safe for a

lower level of care.





DD: 06/02/2018 06:53

DT: 06/02/2018 19:03

JOB# [id number]  [id number]

## 2018-06-03 NOTE — PROGRESS NOTES
DATE:  



IDENTIFICATION:  A 57-year-old female.



SUBJECTIVE:  The patient seen and examined.  The patient is lying in the bed. 

Denies any chest pain, shortness of breath, palpitation.  Discussed with nursing

staff about their concerns and treatment plan.



PHYSICAL EXAMINATION:

VITAL SIGNS:  See nurse's note.

HEENT:  Poor dentition.

NECK:  Supple, no JVD, no lymphadenopathy.

HEART:  Regular, no murmur.

CHEST:  Lung equal in expansion.

LUNGS:  No wheezing, no crackles.

ABDOMEN:  Soft.  No guarding.  No rigidity.  Bowel sounds are present.

EXTREMITIES:  No edema.

NEUROLOGIC:  Nonfocal.



CLINICAL IMPRESSION:

1.  Coronary artery disease.

2.  Hypertension.

3.  Degenerative joint disease.

4.  Psychotic disorder.

5.  Obesity.



PLAN:  Medical management for coronary artery disease, hypertension,

degenerative joint disease and obesity.  Provide general nursing care.  Psych

medication, psych followup along with nutritional support.  Care plan reviewed

and discussed with staff.





DD: 06/03/2018 14:37

DT: 06/03/2018 17:57

JOB# [id number]  [id number]

## 2018-06-03 NOTE — PROGRESS NOTES
DATE:  06/03/2018



SUBJECTIVE:  The patient is currently in the hospital, history of bipolar.  The

patient mumbling to self, not making any sense, and I had difficulty

understanding her.  Some grandiosity noted.  Poor sleep.  Early morning

awakenings.  Staff noting she is mumbling to self, restless, yelling throughout

the night, not stable, poorly oriented, responding heavily to internal stimuli,

still delusional.



ASSESSMENT:  The patient remains psychotic, responding heavily to internal

stimuli, nonsensical, and disorganized.



PLAN:  We will continue to monitor.  The patient may benefit from increasing

dosages of antipsychotic medications.  I will be increasing her dose of Seroquel

today.  Given her ongoing symptoms, she is not safe for a lower level of care.





DD: 06/03/2018 07:04

DT: 06/03/2018 09:38

JOB# [id number]  [id number]

## 2018-06-05 LAB
ANION GAP SERPL CALC-SCNC: 9 MMOL/L (ref 7–16)
BUN SERPL-MCNC: 16 MG/DL (ref 7–25)
CALCIUM SERPL-MCNC: 9.3 MG/DL (ref 8.6–10.3)
CHLORIDE SERPL-SCNC: 100 MEQ/L (ref 98–107)
CO2 SERPL-SCNC: 29.9 MEQ/L (ref 21–31)
CREAT SERPL-MCNC: 0.5 MG/DL (ref 0.6–1.2)
GLUCOSE SERPL-MCNC: 107 MG/DL (ref 70–105)
MAGNESIUM SERPL-MCNC: 1.7 MG/DL (ref 1.9–2.7)
POTASSIUM SERPL-SCNC: 3.9 MEQ/L (ref 3.5–5.1)
SODIUM SERPL-SCNC: 135 MEQ/L (ref 136–145)

## 2018-06-05 NOTE — PROGRESS NOTES
DATE:  06/04/2018



Chart reviewed and the patient interviewed.  Also, discussed the patient's

condition with the staff and reviewed records and labs.  The patient continued

to be demanding.  The patient also is in angry and in irritable mood.  She also

is easily agitated and she is aggressive with the staff and with peers.  The

patient also is still unable to provide any safe plan for self-care.  She also

still seems to be preoccupied and also argumentative, especially when talking

about her medications and at times the patient does not want to take

medications, but after the staff discussed the importance with taking

medications the patient has been taking it.  The patient denies any side effects

of medications.



ASSESSMENT:  The patient is still agitated and is still demanding and psychotic.



TREATMENT PLAN:  We will continue to monitor the patient's behavior and

condition closely.  Also, continue adjusting psychotropic medications and

working on her discharge plans and placement issue.





DD: 06/04/2018 21:23

DT: 06/05/2018 20:39

JOB# [id number]  [id number]

## 2018-06-06 RX ADMIN — Medication SCH: at 15:00

## 2018-06-06 NOTE — PROGRESS NOTES
DATE:  



SUBJECTIVE:  Chart reviewed and the patient interviewed.  Also discussed the

patient's condition with the staff and reviewed records and labs.  The patient

continued to be impulsive and she is still in angry and in irritable moods.  The

patient also is intrusive to others and also the patient was interrupting me

during my talking to the nurses and she is actively hallucinating.  The patient

also continued to talk about seeing demons and spirits and then yells and

screams.  She also still thinks that somebody out there to get her money and to

bother her.  On the other hand, the patient has continued to comply with taking

her medications with no side effects of medications.



ASSESSMENT:  The patient is still agitated and in irritable mood and impulsive.



TREATMENT PLAN:  We will continue monitoring her behavior and her condition

closely.  Also, we will increase Seroquel to 150 mg twice a day and 400 mg at

bedtime.  Also, working with  in regard to discharge plans and

placement issue and so far  is unable to give me any details about

her discharge or where the patient can go to live.



LABORATORY DATA:  Depakote blood level that was done on 05/31 came back to be

68.9, which is within therapeutic level and we will continue at same dose and we

will continue to follow up.





DD: 06/05/2018 07:37

DT: 06/06/2018 02:49

JOB# [id number]  [id number]

## 2018-06-07 RX ADMIN — Medication SCH ECT: at 09:47

## 2018-06-07 NOTE — PROGRESS NOTES
DATE:  



SUBJECTIVE:  Chart reviewed and the patient interviewed.  Also discussed the

patient's condition with the staff and reviewed records and labs.  The patient

continued to be argumentative and she is still hyperverbal.  The patient also is

still easily agitated.  The patient also is still suspicious and impulsive and

demanding.  She also is still paranoid and in angry mood.  The patient also

asking staff for food and when they provided her food, she takes it and threw it

away on the floor and cursing staff after that for no apparent reason.  Also,

her personal hygiene is poor and the patient has been refusing to take a shower

and her personal hygiene is deteriorating.  Otherwise, the patient is compliant

with taking her medications and no side effect of medications.



ASSESSMENT:  The patient is still agitated and impulsive and can be dangerous to

others.



TREATMENT PLAN:  Continue to monitor her behavior and her condition closely. 

Also, we will increase Klonopin to 1 mg twice a day.  Depakote blood level that

was done on 05/31/2018 came back to be 68.9 and we will continue same dose of

Klonopin.  At the same time, we will increase Klonopin to 1 mg twice a day,

hopefully to help with irritability and her agitation and we will continue to

follow up closely.





DD: 06/06/2018 09:53

DT: 06/07/2018 09:20

[facility]# [id number]  [id number]

## 2018-06-07 NOTE — PROGRESS NOTES
DATE:  06/06/2018



SUBJECTIVE:  The patient seen and examined.  The patient is lying in the bed. 

The patient has no chest pain, shortness of breath, palpitation, dizziness,

nausea, vomiting.  The patient is ambulatory.



PHYSICAL EXAMINATION:

VITAL SIGNS:  Temperature 97.2, pulse 95, respiratory rate is 18, blood pressure

140/85.

HEENT:  No facial asymmetry.

NECK:  Supple, no JVD.

HEART:  Regular.

CHEST AND LUNGS:  Equal in expansion.  No wheezing, no crackles.

ABDOMEN:  Soft.

EXTREMITIES:  No edema.



LABORATORY DATA:  On 06/05/2018 has been reviewed.  Fasting blood sugar of 107

with magnesium of 1.7, potassium of 3.9.



CLINICAL IMPRESSION:

1.  Hyperglycemia.

2.  Hypomagnesemia.

3.  Metabolic syndrome

4.  Hypertension.

5.  Degenerative joint disease.

6.  Psychotic disorder.

7.  Coronary artery disease.



PLAN:

1.  Psychotic evaluation and management deferred to psychiatrist.

2.  Give Slow-Mag.

3.  Monitor blood pressure.

4.  General nursing care.

5.  Symptoms management.

6.  Appropriate home medicine reconciliation.

7.  Follow lab.

8.  We will continue to follow this patient during the stay in the hospital.





DD: 06/06/2018 11:00

DT: 06/07/2018 06:59

JOB# [id number]  [id number]

## 2018-06-08 RX ADMIN — Medication SCH ECT: at 09:02

## 2018-06-08 NOTE — DISCHARGE SUMMARY
DATE OF DISCHARGE:  06/08/2018



PATIENT'S AGE:  57.



SEX:  Female.



PHYSICIAN:  Bianka Basurto M.D., M.P.H.



FINAL DIAGNOSES AND PRIMARY DIAGNOSES:  Schizoaffective disorder, bipolar type,

severe, with psychotic features.



REASON FOR HOSPITALIZATION:  The patient was admitted to the hospital because of

increased irritability and increased agitation in the nursing home where she was

living and she was aggressive and violent with the staff and they were not able

to handle her agitation and irritability there.



HOSPITAL COURSE:  The patient continued to be extremely irritable and extremely

agitated.  The patient also was restless.  The patient also was unable to answer

any of my questions coherently and she kept being intrusive to staff and others.

 The patient also was in irritable mood.  The patient was started on Seroquel

and dose adjusted to 400 mg at bedtime and 200 mg twice a day.  Also was giving

Depakote 500 mg twice a day.  The patient was calmer.  Placement was an issue. 

Finally, [facility] accepted the patient and the patient discharged.



Physical exam of the patient showed no major medical problems.



AFTER DISCHARGE PLANS:  The patient discharged from the hospital with plan to

[facility].  Plan to follow her there.



LABORATORY DATA:  Depakote blood level on 05/31/2018 was 68.9, which is within

therapeutic level.



EXPECTED OUTCOME AFTER DISCHARGE:  Fair if the patient continues with her

adjusting psychotropic medications and follow up with discharge plans.





DD: 06/08/2018 06:18

DT: 06/08/2018 17:16

JOB# [id number]  [id number]

## 2018-06-08 NOTE — PROGRESS NOTES
DATE:  06/07/2018



SUBJECTIVE:  Chart reviewed and the patient interviewed.  Also discussed the

patient's condition with the staff and reviewed the records and labs.  The

patient continued to be in irritable and angry mood.  The patient also is still

argumentative and she still needs lots of redirections.  She is intrusive to

others.  It seems slightly easier to redirect her.  The patient also still has

difficulty sleeping all night, but it seemed that she is sleeping slightly

better.  Otherwise, the patient continued to comply with taking her medications

and no side effects of medications.



ASSESSMENT:  The patient is less manicky and less psychotic.



TREATMENT PLAN:  Continue to monitor her behavior.  Also,  still has

difficulty finding placement for the patient.  Awaiting for placement and once

placement is available, planning to discharge the patient to placement with

plans for followup there.





DD: 06/07/2018 06:52

DT: 06/08/2018 02:35

JOB# [id number]  [id number]

## 2018-06-09 RX ADMIN — Medication SCH ECT: at 08:11

## 2018-06-09 NOTE — PROGRESS NOTES
DATE:  06/09/2018



SUBJECTIVE:  The patient in the hospital, history of bipolar, agitated, restless

for a few days, not following directions, at other patients aggressive.  Dr. Basurto seeing the patient over the past couple of days, noting improvement, in

fact the patient was to have been discharged yesterday, still remains irritable,

angry, but redirectable.  No agitation, no aggressive behavior, psychotic

symptoms dissipating.  The patient slept for about 5 hours, still with some

suspicions.



ASSESSMENT:  The patient seems to be improving, calmer, psychotic symptoms

dissipating.



Medications seem to be effective.  No overt side effects.  No EPS for example,

we will continue to monitor.





DD: 06/09/2018 07:04

DT: 06/09/2018 21:06

JOB# [id number]  [id number]

## 2018-06-09 NOTE — PROGRESS NOTES
DATE:  06/08/2018



SUBJECTIVE:  The patient is seen and examined.  The patient is accepted to

[facility] today.  The patient is going to be discharged today.



PHYSICAL EXAMINATION:

GENERAL:  The patient is more ambulatory and no new event.

VITAL SIGNS:  Temperature 98.2, pulse 94, respiratory rate 18, and blood

pressure 114/80.

HEENT:  No facial asymmetry.  Poor dentition.

NECK:  Supple, no JVD.

HEART:  Regular.

CHEST:  Lung equal in expansion.

LUNGS:  No wheezing, no crackles.

ABDOMEN:  Soft.

EXTREMITIES:  No edema.



CLINICAL IMPRESSION:

1.  Psychotic disorder.

2.  Hypertension.

3.  Obesity.

4.  Degenerative joint disease.

5.  Cutaneous candidiasis, resolved.

6.  Coronary artery disease.



PLAN:  The patient is medically stable to be discharged to lower level of care. 

Have outpatient followup by her primary care physician as well as psychiatrist.





DD: 06/08/2018 09:18

DT: 06/09/2018 03:49

JOB# [id number]  [id number]

## 2018-06-10 RX ADMIN — Medication SCH ECT: at 08:16

## 2018-06-11 RX ADMIN — Medication SCH ECT: at 08:48

## 2018-06-11 NOTE — PROGRESS NOTES
DATE:  06/11/2018



Case was discussed with staff of the patient, reviewed records.  This is a well

known history.  I have seen her before covering for Dr. Basurto.  The patient is

currently stable.  No acting out behavior.  She is sleeping well, eating well. 

The staff report that Dr. Basurto gave an order for discharge and she is ready to

go today.  No side effects with the medication.  The patient will follow up with

the psychiatrist, primary care physician and a therapist.



EXPECTED OUTCOME:  Stable if the patient complies with the above.





DD: 06/11/2018 12:43

DT: 06/11/2018 22:19

JOB# [id number]  [id number]

## 2018-06-11 NOTE — PROGRESS NOTES
DATE:  06/10/2018



SUBJECTIVE:  The patient in the hospital, agitated, restless, bizarre, stating

that she is waiting for her daughter to be admitted to the hospital, stating her

daughter needs to be a patient here, paranoid, slept for about 4 hours,

delusional, singing loudly at times, mostly in her room, talking to self, making

nonsensical statements, talking about people's spirits in the room.  Medications

were noted including doses and frequencies.



ASSESSMENT:  The patient remains symptomatic, bizarre, delusional, psychotic.



PLAN:  We will continue to monitor.  Given her ongoing behaviors, she is not

safe for discharge at this time.





DD: 06/10/2018 07:09

DT: 06/11/2018 02:14

[facility]# [id number]  [id number]

## 2018-07-19 ENCOUNTER — HOSPITAL ENCOUNTER (INPATIENT)
Dept: HOSPITAL 36 - ER | Age: 58
LOS: 15 days | Discharge: NURSING, REHAB OR LONG-TERM CARE | DRG: 885 | End: 2018-08-03
Attending: PSYCHIATRY & NEUROLOGY | Admitting: PSYCHIATRY & NEUROLOGY
Payer: MEDICARE

## 2018-07-19 VITALS — SYSTOLIC BLOOD PRESSURE: 143 MMHG | DIASTOLIC BLOOD PRESSURE: 88 MMHG

## 2018-07-19 DIAGNOSIS — I25.10: ICD-10-CM

## 2018-07-19 DIAGNOSIS — E78.5: ICD-10-CM

## 2018-07-19 DIAGNOSIS — Z83.3: ICD-10-CM

## 2018-07-19 DIAGNOSIS — E87.1: ICD-10-CM

## 2018-07-19 DIAGNOSIS — F23: ICD-10-CM

## 2018-07-19 DIAGNOSIS — Z82.49: ICD-10-CM

## 2018-07-19 DIAGNOSIS — Z88.8: ICD-10-CM

## 2018-07-19 DIAGNOSIS — Z91.14: ICD-10-CM

## 2018-07-19 DIAGNOSIS — M19.90: ICD-10-CM

## 2018-07-19 DIAGNOSIS — I10: ICD-10-CM

## 2018-07-19 DIAGNOSIS — F25.0: Primary | ICD-10-CM

## 2018-07-19 LAB
ALBUMIN SERPL-MCNC: 3.8 GM/DL (ref 3.7–5.3)
ALBUMIN/GLOB SERPL: 1.5 {RATIO} (ref 1–1.8)
ALP SERPL-CCNC: 74 U/L (ref 34–104)
ALT SERPL-CCNC: 36 U/L (ref 7–52)
AMPHET UR-MCNC: NEGATIVE NG/ML
ANION GAP SERPL CALC-SCNC: 11.8 MMOL/L (ref 7–16)
APAP SERPL-MCNC: < 10 UG/ML (ref 10–30)
APPEARANCE UR: CLEAR
AST SERPL-CCNC: 27 U/L (ref 13–39)
BACTERIA #/AREA URNS HPF: (no result) /HPF
BARBITURATES UR-MCNC: NEGATIVE UG/ML
BASOPHILS # BLD AUTO: 0 TH/CUMM (ref 0–0.2)
BASOPHILS NFR BLD AUTO: 0 % (ref 0–2)
BENZODIAZEPINES PNL UR: NEGATIVE
BILIRUB SERPL-MCNC: 0.5 MG/DL (ref 0.3–1)
BILIRUB UR-MCNC: NEGATIVE MG/DL
BUN SERPL-MCNC: 12 MG/DL (ref 7–25)
CALCIUM SERPL-MCNC: 9.8 MG/DL (ref 8.6–10.3)
CANNABINOIDS SERPL QL CFM: NEGATIVE
CHLORIDE SERPL-SCNC: 99 MEQ/L (ref 98–107)
CHOLEST SERPL-MCNC: 120 MG/DL (ref ?–200)
CO2 SERPL-SCNC: 26.2 MEQ/L (ref 21–31)
COCAINE METAB.OTHER UR-MCNC: NEGATIVE NG/ML
COLOR UR: YELLOW
CREAT SERPL-MCNC: 0.5 MG/DL (ref 0.6–1.2)
EOSINOPHIL # BLD AUTO: 0.2 TH/CMM (ref 0.1–0.4)
EOSINOPHIL NFR BLD AUTO: 2.3 % (ref 0–5)
EPI CELLS URNS QL MICRO: (no result) /LPF
ERYTHROCYTE [DISTWIDTH] IN BLOOD BY AUTOMATED COUNT: 13 % (ref 11.5–20)
GLOBULIN SER-MCNC: 2.5 GM/DL
GLUCOSE SERPL-MCNC: 83 MG/DL (ref 70–105)
GLUCOSE UR STRIP-MCNC: NEGATIVE MG/DL
HBA1C MFR BLD: 5.2 % (ref 4–6)
HCT VFR BLD CALC: 39 % (ref 41–60)
HDLC SERPL-MCNC: 53 MG/DL (ref 23–92)
HGB BLD-MCNC: 13 GM/DL (ref 12–16)
HGB BLD-MCNC: 15 G/DL (ref 4–35)
KETONES UR STRIP-MCNC: NEGATIVE MG/DL
LEUKOCYTE ESTERASE UR-ACNC: NEGATIVE
LYMPHOCYTE AB SER FC-ACNC: 1.7 TH/CMM (ref 1.5–3)
LYMPHOCYTES NFR BLD AUTO: 24.6 % (ref 20–50)
MCH RBC QN AUTO: 30.3 PG (ref 27–31)
MCHC RBC AUTO-ENTMCNC: 33.4 PG (ref 28–36)
MCV RBC AUTO: 90.6 FL (ref 81–100)
METHADONE UR CFM-MCNC: NEGATIVE NG/ML
METHAMPHET UR QL: NEGATIVE
MICRO URNS: YES
MONOCYTES # BLD AUTO: 0.7 TH/CMM (ref 0.3–1)
MONOCYTES NFR BLD AUTO: 10.2 % (ref 2–10)
NEUTROPHILS # BLD: 4.2 TH/CMM (ref 1.8–8)
NEUTROPHILS NFR BLD AUTO: 62.9 % (ref 40–80)
NITRITE UR QL STRIP: NEGATIVE
OPIATES UR QL: NEGATIVE
PCP UR-MCNC: NEGATIVE UG/L
PH UR STRIP: 5.5 [PH] (ref 4.6–8)
PLATELET # BLD: 179 TH/CMM (ref 150–400)
PMV BLD AUTO: 8.1 FL
POTASSIUM SERPL-SCNC: 4 MEQ/L (ref 3.5–5.1)
PROT UR STRIP-MCNC: NEGATIVE MG/DL
RBC # BLD AUTO: 4.3 MIL/CMM (ref 3.8–5.1)
RBC # UR STRIP: NEGATIVE /UL
RBC #/AREA URNS HPF: (no result) /HPF (ref 0–5)
SALICYLATES SERPL-MCNC: < 25 MG/L (ref 30–100)
SODIUM SERPL-SCNC: 133 MEQ/L (ref 136–145)
SP GR UR STRIP: <= 1.005 (ref 1–1.03)
TRICYCLICS UR QL: POSITIVE
TRIGL SERPL-MCNC: 61 MG/DL (ref ?–150)
URINALYSIS COMPLETE PNL UR: (no result)
UROBILINOGEN UR STRIP-ACNC: 0.2 E.U./DL (ref 0.2–1)
WBC # BLD AUTO: 6.8 TH/CMM (ref 4.8–10.8)
WBC #/AREA URNS HPF: (no result) /HPF (ref 0–5)

## 2018-07-19 PROCEDURE — Z7610: HCPCS

## 2018-07-19 PROCEDURE — X3904: HCPCS
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
## 2018-07-20 RX ADMIN — Medication SCH ECT: at 09:13

## 2018-07-20 RX ADMIN — RISPERIDONE SCH MG: 4 TABLET ORAL at 09:13

## 2018-07-20 RX ADMIN — RISPERIDONE SCH MG: 4 TABLET ORAL at 16:33

## 2018-07-21 RX ADMIN — Medication SCH: at 08:21

## 2018-07-21 RX ADMIN — RISPERIDONE SCH MG: 4 TABLET ORAL at 16:18

## 2018-07-21 RX ADMIN — RISPERIDONE SCH: 4 TABLET ORAL at 08:26

## 2018-07-21 NOTE — HISTORY & PHYSICAL
ADMIT DATE:  07/19/2018



REASON FOR ADMISSION:  Psychiatric disorders.



HISTORY OF PRESENT ILLNESS:  This is a 57-year-old female with underlying

history of hypertension, mental health disorders, admitted to [facility] for underlying psychiatric illness by Dr. Basurto.  Dr. Basurto is evaluating

this patient.  The patient denies any complaints or concerns.



PAST MEDICAL HISTORY:  As per HPI.



PAST SURGICAL HISTORY:  None reported significant past surgery.



FAMILY HISTORY:  Noncontributory.



SOCIAL HISTORY:  No reported alcohol, tobacco, or street drug use.



MEDICATIONS:  On Seroquel, Risperdal, Depakene, Ambien, multivitamins, Flomax,

Restoril, Lasix, Benadryl, Klonopin, Coreg, Maalox, Tylenol.



REVIEW OF SYSTEMS:  No fever, no chills, no diarrhea, no vomiting.

CHEST:  No trouble breathing.  No headache.  No visual or speech disturbances. 

No dysuria or hematuria.  No constipation, diarrhea, or rectal bleeding.



PHYSICAL EXAMINATION:

VITAL SIGNS:  Temperature 98.3, pulse 86, respirations 19, blood pressure

142/66, 95% on room air.

HEENT:  Unremarkable.

HEART:  S1, S2 normal.

LUNGS:  Clear to auscultation bilaterally.

ABDOMEN:  Soft, nontender.  No guarding.

NEUROLOGIC:  The patient is awake.  Moves all extremities.  Grossly nonfocal.

EXTREMITIES:  No edema.



AVAILABLE LABORATORY DATA:  As follows:  WBC 6.8, hemoglobin 13.0, hematocrit

39.0, platelets 179.  Sodium 132, potassium is 4.0, BUN 12, creatinine 0.5. 

Hemoglobin A1c 5.2.  AST 27, ALT 36.  LDL 40.  TSH 1.19.  Urine negative for

leuko, negative for nitrites.  Urine drug screen ____ for any drugs.



IMPRESSION:

1.  Hypertension.

2.  Mild hyponatremia.

3.  Mental health disorders.



PLAN:  The patient admitted to [facility].  Psych management per

psychiatrist.  Continue patient's blood pressure medication, monitor blood

pressures.  Fall precaution, aspiration precautions.  Monitor for psychotropic

drug side effects.  The patient's labs reviewed.  Discussed the patient's

condition and plan of care discussed with nursing staff.





DD: 07/20/2018 21:48

DT: 07/20/2018 23:18

JOB# [id number]  [id number]

## 2018-07-21 NOTE — PSYCHIATRIC EVALUATION
DATE OF SERVICE:  07/21/2018



PSYCHIATRIC INITIAL EVALUATION AND MENTAL STATUS EXAM



AGE:  57.



SEX:  Female.



PHYSICIAN:  Dr. Basurto.



CHIEF COMPLAINT:  Disruptive behavior and aggressive towards staff and others.



HISTORY OF PRESENT ILLNESS:  The patient is a 57-year-old female who was

transferred from [facility] because of increased

agitation and because of irritability.  The patient has been aggressive with the

staff in the facility and not been able to follow any of staff directions.  The

patient also has been verbally abusive and hostile towards others including

yelling and screaming.  The patient also is in angry mood and has been

suspicious and paranoid.  She has been taking Klonopin 1 mg twice a day and has

been also taking Seroquel in dose of 50 mg at bedtime but her behavior continued

to deteriorate.



PAST PSYCHIATRIC HISTORY:  The patient has history of what seems to be

schizoaffective disorder versus schizophrenia.



PAST MEDICAL HISTORY:  The patient has a history of peripheral edema.  Also,

_____ about she has a history of hypertension and arthritis as well as

hyperlipidemia.



SOCIAL HISTORY:  The patient lives in [facility]. 

No known alcohol or drug use.



ALLERGIES:  No known allergies.



MENTAL STATUS EXAMINATION:  The patient appears much older than stated age. 

Disheveled.  Flat affect.  Irritable mood.  Thought processes are circumstantial

with occasional flight of ideas.  The patient denies any hallucinations but

seems to be suspicious and paranoid.  The patient denies any thoughts of suicide

or homicide.  The patient is alert and oriented to time, place, person, and

situation.  Intact immediate memory and she remembered the events happened prior

to this admission.  Intact recent memory and she remembered the events happened

two weeks ago.  Intact remote memory and she remembered her birth date.  Poor

insight.  Poor judgment.



ASSESSMENT:

PRIMARY DIAGNOSES:  Schizoaffective disorder, bipolar type, severe, with

psychotic features.



TREATMENT PLAN:  We will monitor the patient's behavior and condition closely. 

We will start individual as well as milieu psychotherapy.  We will monitor

psychotropic medications.



ESTIMATED LENGTH OF STAY:  5-7 days.



PATIENT STRENGTHS AND WEAKNESSES:  The patient's strength is not clear at this

time.  Weaknesses:  Her ineffective coping and poor judgment and poor impulse

control.



AFTER DISCHARGE PLAN:  The patient will return to [facility]

with plans for outpatient treatment followup.



CRITERIA FOR DISCHARGE:  The patient will not be psychotic and will stabilize

psychotropic medications and will establish outpatient treatment plans.





DD: 07/21/2018 13:34

DT: 07/21/2018 20:12

JOB# [id number]  [id number]

## 2018-07-22 RX ADMIN — RISPERIDONE SCH: 4 TABLET ORAL at 09:30

## 2018-07-22 RX ADMIN — RISPERIDONE SCH MG: 4 TABLET ORAL at 16:52

## 2018-07-22 RX ADMIN — Medication SCH: at 09:10

## 2018-07-22 NOTE — PROGRESS NOTES
DATE:  07/22/2018



SUBJECTIVE:  Chart reviewed and the patient interviewed.  Also, discussed the

patient's condition with the staff and reviewed records and labs.  The patient

is still severely angry and have angry outbursts.  The patient answers to my

questions, was "I don't care."  She is trying to stay naked in spite of staff

trying to help her out, but she refuses help.  The patient also trying to hit

and kicked the doors.  She also has difficulty following staff directions. 

Otherwise, the patient is compliant with taking medications with no side effect

of medications.  The patient, currently is taking Seroquel 200 mg twice a day

and 400 mg at bedtime as well as Risperdal 2 mg twice a day and Depakote 250 mg

twice a day.  Depakote blood level is pending.





DD: 07/22/2018 15:57

DT: 07/22/2018 19:35

JOB# [id number]  [id number]

## 2018-07-22 NOTE — GENERAL PROGRESS NOTE
Subjective





- Review of Systems


Service Date: 07/22/18


Subjective: 





patient doing ok Per nursing staff patient refused her BP meds this am





Objective





- Results


Result Diagrams: 


 07/19/18 19:05





 07/19/18 19:05


Recent Labs: 


 Laboratory Last Values











WBC  6.8 Th/cmm (4.8-10.8)   07/19/18  19:05    


 


RBC  4.30 Mil/cmm (3.80-5.10)   07/19/18  19:05    


 


Hgb  13.0 gm/dL (12-16)   07/19/18  19:05    


 


Hct  39.0 % (41.0-60)  L  07/19/18  19:05    


 


MCV  90.6 fl ()   07/19/18  19:05    


 


MCH  30.3 pg (27.0-31.0)   07/19/18  19:05    


 


MCHC Differential  33.4 pg (28.0-36.0)   07/19/18  19:05    


 


RDW  13.0 % (11.5-20.0)   07/19/18  19:05    


 


Plt Count  179 Th/cmm (150-400)   07/19/18  19:05    


 


MPV  8.1 fl  07/19/18  19:05    


 


Neutrophils %  62.9 % (40.0-80.0)   07/19/18  19:05    


 


Lymphocytes %  24.6 % (20.0-50.0)   07/19/18  19:05    


 


Monocytes %  10.2 % (2.0-10.0)  H  07/19/18  19:05    


 


Eosinophils %  2.3 % (0.0-5.0)   07/19/18  19:05    


 


Basophils %  0.0 % (0.0-2.0)   07/19/18  19:05    


 


Sodium  133 mEq/L (136-145)  L  07/19/18  19:05    


 


Potassium  4.0 mEq/L (3.5-5.1)   07/19/18  19:05    


 


Chloride  99 mEq/L ()   07/19/18  19:05    


 


Carbon Dioxide  26.2 mEq/L (21.0-31.0)   07/19/18  19:05    


 


Anion Gap  11.8  (7.0-16.0)   07/19/18  19:05    


 


BUN  12 mg/dL (7-25)   07/19/18  19:05    


 


Creatinine  0.5 mg/dL (0.6-1.2)  L  07/19/18  19:05    


 


Est GFR ( Amer)  > 60.0 ml/min (>90)   07/19/18  19:05    


 


Est GFR (Non-Af Amer)  > 60.0 ml/min  07/19/18  19:05    


 


BUN/Creatinine Ratio  24.0   07/19/18  19:05    


 


Glucose  83 mg/dL ()   07/19/18  19:05    


 


Hemoglobin A1c %  5.2 % (4.0-6.0)   07/19/18  19:05    


 


Calcium  9.8 mg/dL (8.6-10.3)   07/19/18  19:05    


 


Total Bilirubin  0.5 mg/dL (0.3-1.0)   07/19/18  19:05    


 


AST  27 U/L (13-39)   07/19/18  19:05    


 


ALT  36 U/L (7-52)   07/19/18  19:05    


 


Alkaline Phosphatase  74 U/L ()   07/19/18  19:05    


 


Troponin I  < 0.01 ng/mL (0.01-0.05)  L  07/19/18  19:05    


 


Total Protein  6.3 gm/dL (6.0-8.3)   07/19/18  19:05    


 


Albumin  3.8 gm/dL (3.7-5.3)   07/19/18  19:05    


 


Globulin  2.5 gm/dL  07/19/18  19:05    


 


Albumin/Globulin Ratio  1.5  (1.0-1.8)   07/19/18  19:05    


 


Triglycerides  61 mg/dL (<150)   07/19/18  19:05    


 


Cholesterol  120 mg/dL (<200)   07/19/18  19:05    


 


LDL Cholesterol Direct  48 mg/dL ()  L  07/19/18  19:05    


 


HDL Cholesterol  53 mg/dL (23-92)   07/19/18  19:05    


 


TSH  1.19 uIU/ml (0.34-5.60)   07/19/18  19:05    


 


Urine Source  CLEAN C   07/19/18  19:43    


 


Urine Color  YELLOW   07/19/18  19:43    


 


Urine Clarity  CLEAR  (CLEAR)   07/19/18  19:43    


 


Urine pH  5.5  (4.6 - 8.0)   07/19/18  19:43    


 


Ur Specific Gravity  <= 1.005  (1.005-1.030)   07/19/18  19:43    


 


Urine Protein  NEGATIVE mg/dL (NEGATIVE)   07/19/18  19:43    


 


Urine Glucose (UA)  NEGATIVE mg/dL (NEGATIVE)   07/19/18  19:43    


 


Urine Ketones  NEGATIVE mg/dL (NEGATIVE)   07/19/18  19:43    


 


Urine Blood  NEGATIVE  (NEGATIVE)   07/19/18  19:43    


 


Urine Nitrate  NEGATIVE  (NEGATIVE)   07/19/18  19:43    


 


Urine Bilirubin  NEGATIVE  (NEGATIVE)   07/19/18  19:43    


 


Urine Urobilinogen  0.2 E.U./dL (0.2 - 1.0)   07/19/18  19:43    


 


Ur Leukocyte Esterase  NEGATIVE  (NEGATIVE)   07/19/18  19:43    


 


Urine RBC  NONE SEEN /hpf (0-5)   07/19/18  19:43    


 


Urine WBC  NONE SEEN /hpf (0-5)   07/19/18  19:43    


 


Ur Epithelial Cells  NONE SEEN /lpf (FEW)   07/19/18  19:43    


 


Urine Bacteria  NONE SEEN /hpf (NONE SEEN)   07/19/18  19:43    


 


Salicylates  < 25.0 mg/L (30.0-100.0)  L  07/19/18  19:05    


 


Urine Opiates Screen  NEGATIVE  (NEGATIVE)   07/19/18  19:43    


 


Urine Methadone Screen  NEGATIVE  (NEGATIVE)   07/19/18  19:43    


 


Acetaminophen  < 10.0 ug/mL (10.0-30.0)  L  07/19/18  19:05    


 


Ur Barbiturates Screen  NEGATIVE  (NEGATIVE)   07/19/18  19:43    


 


Ur Tricyclics Screen  POSITIVE  (NEGATIVE)  H  07/19/18  19:43    


 


Ur Phencyclidine Scrn  NEGATIVE  (NEGATIVE)   07/19/18  19:43    


 


Amphetamines Screen  NEGATIVE  (NEGATIVE)   07/19/18  19:43    


 


U Methamphetamines Scrn  NEGATIVE  (NEGATIVE)   07/19/18  19:43    


 


U Benzodiazepines Scrn  NEGATIVE  (NEGATIVE)   07/19/18  19:43    


 


U Cocaine Metab Screen  NEGATIVE  (NEGATIVE)   07/19/18  19:43    


 


U Cannabinoids Screen  NEGATIVE  (NEGATIVE)   07/19/18  19:43    


 


Ethyl Alcohol  < 10 mg/dL (0-10)   07/19/18  19:05    


 


RPR  NONREACTIVE  (NONREACTIVE)   07/19/18  19:05    














- Physical Exam


Vitals and I&O: 


 Vital Signs











Temp  96.3 F   07/22/18 15:57


 


Pulse  79   07/22/18 15:57


 


Resp  19   07/22/18 15:57


 


BP  196/104   07/22/18 15:57


 


Pulse Ox  96   07/22/18 15:57











Active Medications: 


Current Medications





Acetaminophen (Tylenol)  650 mg PO Q4HR PRN


   PRN Reason: Mild Pain / Temp above 101


   Stop: 09/17/18 23:05


Al Hydrox/Mg Hydrox/Simethicone (Maalox)  30 ml PO Q4HR PRN


   PRN Reason: GI DISTRESS


   Stop: 09/17/18 23:11


Bisacodyl (Dulcolax 10 Mg Supp)  10 mg RC DAILY PRN


   PRN Reason: IF MOM INEFFECTIVE


   Stop: 09/17/18 23:05


Carvedilol (Coreg)  6.25 mg PO BID [facility]


   Stop: 09/18/18 08:59


   Last Admin: 07/22/18 09:30 Dose:  Not Given


Clonazepam (Klonopin)  1 mg PO BID [facility]; Protocol


   Stop: 09/18/18 08:59


   Last Admin: 07/22/18 09:10 Dose:  Not Given


Diphenhydramine HCl (Benadryl)  50 mg PO HS [facility]


   Stop: 09/18/18 20:59


   Last Admin: 07/21/18 21:37 Dose:  50 mg


Docusate Sodium (Colace)  100 mg PO DAILY [facility]


   Stop: 09/18/18 08:59


   Last Admin: 07/22/18 09:00 Dose:  Not Given


Furosemide (Lasix)  40 mg PO DAILY [facility]


   Stop: 09/18/18 08:59


   Last Admin: 07/22/18 09:30 Dose:  Not Given


Hydroxyzine Pamoate (Vistaril)  25 mg PO Q4HR [facility]; Protocol


   Stop: 09/18/18 00:00


   Last Admin: 07/22/18 13:48 Dose:  Not Given


Magnesium Chloride (Slow-Mag)  1 ect PO DAILY [facility]


   Stop: 09/18/18 08:59


   Last Admin: 07/22/18 09:10 Dose:  Not Given


Magnesium Hydroxide (Milk Of Magnesia)  30 ml PO HS PRN


   PRN Reason: Constipation


   Stop: 09/17/18 23:05


Multivitamins/Vitamin C (Theragran)  1 tab PO DAILY [facility]


   Stop: 09/18/18 08:59


   Last Admin: 07/22/18 09:10 Dose:  Not Given


Quetiapine Fumarate (Seroquel)  200 mg PO BID [facility]; Protocol


   Stop: 09/18/18 08:59


   Last Admin: 07/22/18 09:30 Dose:  Not Given


Quetiapine Fumarate (Seroquel)  400 mg PO HS [facility]; Protocol


   Stop: 09/18/18 20:59


   Last Admin: 07/21/18 21:38 Dose:  400 mg


Risperidone (Risperdal)  2 mg PO BID [facility]; Protocol


   Stop: 09/18/18 08:59


   Last Admin: 07/22/18 09:30 Dose:  Not Given


Valproate Sodium (Depakene)  250 mg PO BID [facility]


   Stop: 09/18/18 08:59


   Last Admin: 07/22/18 09:30 Dose:  Not Given


Zolpidem Tartrate (Ambien)  5 mg PO PRN PRN


   PRN Reason: Insomnia


   Stop: 09/17/18 23:05








Cardiovascular: Regular rate


Lungs: Clear to auscultation





Assessment/Plan





- Problem List


Patient Problems: 


All Active Problems





INCREASED AGITATION/AGGRESION  (Acute) 











- Assessment


Assessment: 





HTN


Non compliance


Mental health disorder





- Plan


Plan: 





Monitor BP


Continue current BP meds


Medication compliance

## 2018-07-23 RX ADMIN — RISPERIDONE SCH MG: 4 TABLET ORAL at 08:30

## 2018-07-23 RX ADMIN — Medication SCH ECT: at 08:36

## 2018-07-23 RX ADMIN — RISPERIDONE SCH MG: 4 TABLET ORAL at 16:42

## 2018-07-23 RX ADMIN — Medication SCH: at 09:43

## 2018-07-23 RX ADMIN — RISPERIDONE SCH: 4 TABLET ORAL at 09:43

## 2018-07-23 NOTE — PROGRESS NOTES
DATE:  07/23/2018



Covering for Dr. Basurto.



SUBJECTIVE:  Case was discussed with staff of the patient, reviewed records. 

This is a 57-year-old female who was admitted on 07/21/2018 because of

aggressive behavior towards staff and others.  The patient transferred from

[place].  The patient has been agitated.  The patient continues to be

agitated, had to be given emergency medication an hour ago.  The patient has a

history of schizoaffective disorder or schizophrenia.  She is also

unpredictable, impulsive, unable to make safe plan for self-care.  She also has

been refusing her medication at times and she is on Seroquel 200 mg twice a day,

400 mg at bedtime, Depakote 250 mg twice a day.  We will continue to work with

the patient in group therapy, milieu therapy, and adjust the medications as

needed.





DD: 07/23/2018 12:43

DT: 07/23/2018 22:55

JOB# [id number]  [id number]

## 2018-07-23 NOTE — PROGRESS NOTES
DATE:     07/21/2018



PATIENT OF:  Bianka Basurto M.D.



SUBJECTIVE:  The patient interviewed.  Case discussed with staff and chart

reviewed. Per the staff, the patient has been labile and unpredictable on the

unit. The patient was interviewed this morning in the nieto hallway. The patient

is actively responding to internal stimuli.  She is threatening the staff.  She

is wheelchairing around the unit and threatening others that she passes by.  She

is also heavily responding to internal stimuli, very easily provoked and

agitated. When I attempted to interview the patient, but she began immediately

yelling at this provider and threatening this provider.



MENTAL STATUS EXAMINATION:  The patient appears in a wheelchair.  She is

unkempt.  She has poor eye contact.  Her speech is loud, hyperverbal and

pressured.  Her mood and affect are labile.  Thought process is disorganized. 

Unable to assess suicidal or homicidal ideations, but she is hostile and

threatening.  She is also responding heavily to internal stimuli.  She is also

paranoid of others.  She is alert and oriented to person.  She does understand

she is in the hospital.  Her insight, judgment and impulse control remain very

poor.



ASSESSMENT:  This is a 57-year-old female admitted to [facility]

for acute psychosis and agitation.  The patient at this time continues to be

aggressive.  She continues to be threatening.  She is a verbally threatening to

providers and other patients on the unit.  She is easily hostile.  In addition,

she is responding heavily to internal stimuli.



PLAN:  I will continue the patient's acute hospitalization. We will continue the

medications prescribed.  Encourage the patient to verbalize her needs. 

Encourage the patient to participate in group and milieu therapy.  Encourage the

patient to work with the  and  for discharge planning

and need for any community resources.





DD: 07/21/2018 23:34

DT: 07/23/2018 13:13

JOB# [id number]  [id number]

MINDI

## 2018-07-24 RX ADMIN — RISPERIDONE SCH MG: 4 TABLET ORAL at 17:29

## 2018-07-24 RX ADMIN — RISPERIDONE SCH: 4 TABLET ORAL at 09:25

## 2018-07-24 RX ADMIN — Medication SCH: at 09:24

## 2018-07-24 RX ADMIN — Medication SCH ECT: at 09:14

## 2018-07-24 RX ADMIN — RISPERIDONE SCH MG: 4 TABLET ORAL at 09:15

## 2018-07-24 NOTE — PROGRESS NOTES
DATE:  07/24/2018



Case was discussed with staff of the patient and records.  The patient continues

to be easily agitated, continues to be unpredictable, impulsive.  Yesterday, she

had to get emergency medication today.  She is a bit calmer.  Continues to be

unpredictable, impulsive, having poor insight.  Unable to make safe plan for

self-care.  Also, has been refusing medication at times.  No side effects with

the medication, no sedation, no nausea, and no extrapyramidal symptoms.  We will

continue to work with the patient group therapy, milieu therapy, and adjust the

medications as needed.





DD: 07/24/2018 12:30

DT: 07/24/2018 20:33

JOB# [id number]  [id number]

## 2018-07-25 RX ADMIN — RISPERIDONE SCH MG: 4 TABLET ORAL at 16:53

## 2018-07-25 RX ADMIN — Medication SCH ECT: at 09:00

## 2018-07-25 RX ADMIN — RISPERIDONE SCH MG: 4 TABLET ORAL at 09:00

## 2018-07-26 RX ADMIN — RISPERIDONE SCH MG: 4 TABLET ORAL at 09:42

## 2018-07-26 RX ADMIN — RISPERIDONE SCH MG: 4 TABLET ORAL at 16:55

## 2018-07-26 RX ADMIN — Medication SCH ECT: at 09:40

## 2018-07-26 RX ADMIN — RISPERIDONE SCH: 4 TABLET ORAL at 17:21

## 2018-07-26 NOTE — PROGRESS NOTES
DATE:  07/26/2018



SUBJECTIVE:  Chart reviewed and the patient interviewed.  Also discussed the

patient's condition with the staff and reviewed records and labs.  The patient

remains extremely agitated and in irritable mood.  The patient is banging on

doors.  She also is intrusive and she is yelling and screaming and have

difficulty following staff directions.  She also has still difficulty expressing

herself and her feelings.  Otherwise, the patient is compliant with taking her

medications with no side effects of medications.



ASSESSMENT:  The patient is still irritable and agitated.



TREATMENT PLAN:  We will continue Seroquel in a dose of 200 mg twice a day and

400 mg at bedtime and continue Risperdal 2 mg twice a day.  Also, we will

increase Klonopin to 1.5 mg twice a day and continue to work on behavior

modification and her irritability.





DD: 07/26/2018 21:13

DT: 07/26/2018 21:51

JOB# [id number]  [id number]

## 2018-07-26 NOTE — PROGRESS NOTES
DATE:  



SUBJECTIVE:  Case was discussed with staff of the patient.  I am seeing this

patient simply because apparently the patient has a court hearing today and she

cannot go, and they want a paper stating that she cannot go.  So, I had to

evaluate her.  The patient is a well-known case to me as I have seen her before

covering for Dr. Basurto, was last visit on 07/24/2018.  The patient continues to

be impulsive, unpredictable, continues to need emergency medication.  Continues

to be confused, having very poor insight.  Unable to make safe plan for

self-care.  At times, noncompliant with her medications, so at this point,

because of her confusion she is incapable of going to court and testifying and

she may also act out during the process, so I will be signing this statement

saying that the patient cannot attend the court because of her impulsivity,

confusion, acting out, unpredictable behavior and Dr. Basurto is managing her

medications.





DD: 07/26/2018 12:22

DT: 07/26/2018 16:23

JOB# [id number]  [id number]

## 2018-07-27 RX ADMIN — RISPERIDONE SCH MG: 4 TABLET ORAL at 08:48

## 2018-07-27 RX ADMIN — Medication SCH: at 08:58

## 2018-07-27 RX ADMIN — Medication SCH ECT: at 08:47

## 2018-07-27 RX ADMIN — RISPERIDONE SCH MG: 4 TABLET ORAL at 16:45

## 2018-07-27 NOTE — GENERAL PROGRESS NOTE
Subjective





- Review of Systems


Subjective: 





patient doing ok Per nursing staff patient refused her BP meds this am





Objective





- Results


Result Diagrams: 


 18 19:05





 18 19:05


Recent Labs: 


 Laboratory Last Values











WBC  6.8 Th/cmm (4.8-10.8)   18  19:05    


 


RBC  4.30 Mil/cmm (3.80-5.10)   18  19:05    


 


Hgb  13.0 gm/dL (12-16)   18  19:05    


 


Hct  39.0 % (41.0-60)  L  18  19:05    


 


MCV  90.6 fl ()   18  19:05    


 


MCH  30.3 pg (27.0-31.0)   18  19:05    


 


MCHC Differential  33.4 pg (28.0-36.0)   18  19:05    


 


RDW  13.0 % (11.5-20.0)   18  19:05    


 


Plt Count  179 Th/cmm (150-400)   18  19:05    


 


MPV  8.1 fl  18  19:05    


 


Neutrophils %  62.9 % (40.0-80.0)   18  19:05    


 


Lymphocytes %  24.6 % (20.0-50.0)   18  19:05    


 


Monocytes %  10.2 % (2.0-10.0)  H  18  19:05    


 


Eosinophils %  2.3 % (0.0-5.0)   18  19:05    


 


Basophils %  0.0 % (0.0-2.0)   18  19:05    


 


Sodium  133 mEq/L (136-145)  L  18  19:05    


 


Potassium  4.0 mEq/L (3.5-5.1)   18  19:05    


 


Chloride  99 mEq/L ()   18  19:05    


 


Carbon Dioxide  26.2 mEq/L (21.0-31.0)   18  19:05    


 


Anion Gap  11.8  (7.0-16.0)   18  19:05    


 


BUN  12 mg/dL (7-25)   18  19:05    


 


Creatinine  0.5 mg/dL (0.6-1.2)  L  18  19:05    


 


Est GFR ( Amer)  > 60.0 ml/min (>90)   18  19:05    


 


Est GFR (Non-Af Amer)  > 60.0 ml/min  18  19:05    


 


BUN/Creatinine Ratio  24.0   18  19:05    


 


Glucose  83 mg/dL ()   18  19:05    


 


Hemoglobin A1c %  5.2 % (4.0-6.0)   18  19:05    


 


Calcium  9.8 mg/dL (8.6-10.3)   18  19:05    


 


Total Bilirubin  0.5 mg/dL (0.3-1.0)   18  19:05    


 


AST  27 U/L (13-39)   18  19:05    


 


ALT  36 U/L (7-52)   18  19:05    


 


Alkaline Phosphatase  74 U/L ()   18  19:05    


 


Troponin I  < 0.01 ng/mL (0.01-0.05)  L  18  19:05    


 


Total Protein  6.3 gm/dL (6.0-8.3)   18  19:05    


 


Albumin  3.8 gm/dL (3.7-5.3)   18  19:05    


 


Globulin  2.5 gm/dL  18  19:05    


 


Albumin/Globulin Ratio  1.5  (1.0-1.8)   18  19:05    


 


Triglycerides  61 mg/dL (<150)   18  19:05    


 


Cholesterol  120 mg/dL (<200)   18  19:05    


 


LDL Cholesterol Direct  48 mg/dL ()  L  18  19:05    


 


HDL Cholesterol  53 mg/dL (23-92)   18  19:05    


 


TSH  1.19 uIU/ml (0.34-5.60)   18  19:05    


 


Urine Source  CLEAN C   18  19:43    


 


Urine Color  YELLOW   18  19:43    


 


Urine Clarity  CLEAR  (CLEAR)   18  19:43    


 


Urine pH  5.5  (4.6 - 8.0)   18  19:43    


 


Ur Specific Gravity  <= 1.005  (1.005-1.030)   18  19:43    


 


Urine Protein  NEGATIVE mg/dL (NEGATIVE)   18  19:43    


 


Urine Glucose (UA)  NEGATIVE mg/dL (NEGATIVE)   18  19:43    


 


Urine Ketones  NEGATIVE mg/dL (NEGATIVE)   18  19:43    


 


Urine Blood  NEGATIVE  (NEGATIVE)   18  19:43    


 


Urine Nitrate  NEGATIVE  (NEGATIVE)   18  19:43    


 


Urine Bilirubin  NEGATIVE  (NEGATIVE)   18  19:43    


 


Urine Urobilinogen  0.2 E.U./dL (0.2 - 1.0)   18  19:43    


 


Ur Leukocyte Esterase  NEGATIVE  (NEGATIVE)   18  19:43    


 


Urine RBC  NONE SEEN /hpf (0-5)   18  19:43    


 


Urine WBC  NONE SEEN /hpf (0-5)   18  19:43    


 


Ur Epithelial Cells  NONE SEEN /lpf (FEW)   18  19:43    


 


Urine Bacteria  NONE SEEN /hpf (NONE SEEN)   18  19:43    


 


Salicylates  < 25.0 mg/L (30.0-100.0)  L  18  19:05    


 


Urine Opiates Screen  NEGATIVE  (NEGATIVE)   18  19:43    


 


Urine Methadone Screen  NEGATIVE  (NEGATIVE)   18  19:43    


 


Acetaminophen  < 10.0 ug/mL (10.0-30.0)  L  18  19:05    


 


Ur Barbiturates Screen  NEGATIVE  (NEGATIVE)   18  19:43    


 


Ur Tricyclics Screen  POSITIVE  (NEGATIVE)  H  18  19:43    


 


Ur Phencyclidine Scrn  NEGATIVE  (NEGATIVE)   18  19:43    


 


Amphetamines Screen  NEGATIVE  (NEGATIVE)   18  19:43    


 


U Methamphetamines Scrn  NEGATIVE  (NEGATIVE)   18  19:43    


 


U Benzodiazepines Scrn  NEGATIVE  (NEGATIVE)   18  19:43    


 


U Cocaine Metab Screen  NEGATIVE  (NEGATIVE)   18  19:43    


 


U Cannabinoids Screen  NEGATIVE  (NEGATIVE)   18  19:43    


 


Ethyl Alcohol  < 10 mg/dL (0-10)   18  19:05    


 


RPR  NONREACTIVE  (NONREACTIVE)   18  19:05    














- Physical Exam


Vitals and I&O: 


 Vital Signs











Temp  98.2 F   18 14:15


 


Pulse  96   18 16:45


 


Resp  19   18 14:15


 


BP  146/84   18 16:45


 


Pulse Ox  97   18 14:15








 Intake & Output











 18





 06:59 18:59 06:59


 


Intake Total 990  


 


Balance 990  


 


Intake:   


 


  Oral 990  


 


Other:   


 


  # Voids 5  


 


  # Bowel Movements 0  











Active Medications: 


Current Medications





Acetaminophen (Tylenol)  650 mg PO Q4HR PRN


   PRN Reason: Mild Pain / Temp above 101


   Stop: 18 23:05


Al Hydrox/Mg Hydrox/Simethicone (Maalox)  30 ml PO Q4HR PRN


   PRN Reason: GI DISTRESS


   Stop: 18 23:11


Bisacodyl (Dulcolax 10 Mg Supp)  10 mg RC DAILY PRN


   PRN Reason: IF MOM INEFFECTIVE


   Stop: 18 23:05


Carvedilol (Coreg)  6.25 mg PO BID [facility]


   Stop: 18 08:59


   Last Admin: 18 16:45 Dose:  6.25 mg


Clonazepam (Klonopin)  1.5 mg PO BID [facility]; Protocol


   Stop: 18 08:59


   Last Admin: 18 16:45 Dose:  1.5 mg


Diphenhydramine HCl (Benadryl)  50 mg PO HS [facility]


   Stop: 18 20:59


   Last Admin: 18 20:36 Dose:  50 mg


Docusate Sodium (Colace)  100 mg PO DAILY [facility]


   Stop: 18 08:59


   Last Admin: 18 08:45 Dose:  100 mg


Furosemide (Lasix)  40 mg PO DAILY [facility]


   Stop: 18 08:59


   Last Admin: 18 08:46 Dose:  40 mg


Hydroxyzine Pamoate (Vistaril)  25 mg PO Q4HR [facility]; Protocol


   Stop: 18 00:00


   Last Admin: 18 20:36 Dose:  25 mg


Magnesium Chloride (Slow-Mag)  1 ect PO DAILY [facility]


   Stop: 18 08:59


   Last Admin: 18 08:58 Dose:  Not Given


Magnesium Hydroxide (Milk Of Magnesia)  30 ml PO HS PRN


   PRN Reason: Constipation


   Stop: 18 23:05


Multivitamins/Vitamin C (Theragran)  1 tab PO DAILY [facility]


   Stop: 18 08:59


   Last Admin: 18 08:47 Dose:  1 tab


Quetiapine Fumarate (Seroquel)  200 mg PO BID [facility]; Protocol


   Stop: 18 08:59


   Last Admin: 18 16:44 Dose:  200 mg


Quetiapine Fumarate (Seroquel)  400 mg PO HS [facility]; Protocol


   Stop: 18 20:59


   Last Admin: 18 21:06 Dose:  Not Given


Risperidone (Risperdal)  2 mg PO BID [facility]; Protocol


   Stop: 18 08:59


   Last Admin: 18 16:45 Dose:  2 mg


Valproate Sodium (Depakene)  250 mg PO BID [facility]


   Stop: 18 08:59


   Last Admin: 18 16:44 Dose:  250 mg


Zolpidem Tartrate (Ambien)  5 mg PO PRN PRN


   PRN Reason: Insomnia


   Stop: 18 23:05


   Last Admin: 18 00:20 Dose:  5 mg








Cardiovascular: Regular rate


Lungs: Clear to auscultation





Assessment/Plan





- Problem List


Patient Problems: 


All Active Problems





INCREASED AGITATION/AGGRESION  (Acute) 











- Assessment


Assessment: 





HTN


Non compliance


Mental health disorder





- Plan


Plan: 





Monitor BP


Continue current BP meds


Medication compliance





Nutritional Asmnt/Malnutr-PDOC





- Dietary Evaluation


Malnutrition Findings (Please click <Entered> for more info): 








Nutritional Asmnt/Malnutrition                             Start:  18 14:

43


Text:                                                      Status: Complete    

  


Freq:                                                                          

  


Protocol:                                                                      

  


 Document     18 14:43  YESICA  (Rec: 18 14:48  YESICA  [id number])


 Nutritional Asmnt/Malnutrition


     Patient General Information


      Nutritional Screening                      Moderate Risk


      Diagnosis                                  psychosis NOS


      Pertinent Medical Hx/Surgical Hx           HTN, mental health disorders


      Subjective Information                     Pt seen in damion-chair in


                                                 [facility], [facility]. Per EMR, PO


                                                 intake 100% of meals.


      Current Diet Order/ Nutrition Support      [facility]


      Pertinent Medications                      colace, lasix, slow-mag,


                                                 theragran, seroquel,


      Pertinent Labs                              Na 133, Cr 0.5, glucose


                                                 83, A1c 5.2


     Nutritional Hx/Data


      Height                                     1.68 m


      Height (Calculated Centimeters)            167.6


      Current Weight (lbs)                       99.79 kg


      Weight (Calculated Kilograms)              99.8


      Weight (Calculated Grams)                  77927.3


      Ideal Body Weight                          130


      Body Mass Index (BMI)                      35.5


      Weight Status                              Obese


     GI Symptoms


      GI Symptoms                                None


      Last BM                                    none


      Difficult in:                              None


      Skin Integrity/Comment:                    javan score 16


      Current %PO                                Good (%)


     Estimated Nutritional Goals


      BEE in Kcals:                              Adj wt of IBW


      Calories/Kcals/Kg                          25-30


      Kcals Calculated                           [id number]


      Protein:                                   Adj wt of IBW


      Protein g/k


      Protein Calculated                         69


      Fluid: ml                                  1725-2070ml (1ml/kcal)


     Nutritional Problem


      No current Nutrition Prob


       Problem                                   N/A


     Malnutrition Alert


      Is there a minimum of two criteria         No


       selected?                                 


       Query Text:Check all the applicable       


       criteria. A minimum of two criteria are   


       recommended for diagnosis of either       


       severe or non-severe malnutrition.        


     Malnutrition Related to Morbid Obesity


      Malnutrition related to morbid obesity     No


     Intervention/Recommendation


      Comments                                   1. Continue with [facility] diet


                                                 as ordered.


                                                 2. Monitor PO intake, wt, labs


                                                 and skin integrity


                                                 3. F/U as low risk in 7 days,


                                                 


     Expected Outcomes/Goals


      Expected Outcomes/Goals                    1. PO intake to meet at least


                                                 75% of nutritional needs.


                                                 2. Wt stability, skin to


                                                 remain intact, labs to


                                                 approach WNL.

## 2018-07-28 RX ADMIN — RISPERIDONE SCH MG: 4 TABLET ORAL at 09:38

## 2018-07-28 RX ADMIN — RISPERIDONE SCH MG: 4 TABLET ORAL at 16:45

## 2018-07-28 RX ADMIN — Medication SCH ECT: at 09:38

## 2018-07-28 NOTE — PROGRESS NOTES
DATE:  07/28/2018



Case was discussed with staff of the patient, reviewed records.  She is well

known.  History obtained before many times.  Covering for Dr. Basurto.  The

patient is considered to be irritable, agitated, banging on the doors,

intrusive, yelling and screaming at times.  Sometimes, she is on emergency

medication.  In general, she is a bit calmer.  She can express herself very

well.  She tends to get confused.  She has been compliant with the medication

with no side effects.  Seroquel was increased to 200 mg twice a day and 400 mg

at bedtime, Risperdal 2 mg twice a day and the plan was to increase to 1.5 mg

twice a day.  I will continue outpatient group therapy, milieu therapy, adjust

medication as needed.





DD: 07/28/2018 13:51

DT: 07/28/2018 23:50

JOB# [id number]  [id number]

## 2018-07-29 RX ADMIN — RISPERIDONE SCH MG: 4 TABLET ORAL at 08:24

## 2018-07-29 RX ADMIN — RISPERIDONE SCH MG: 4 TABLET ORAL at 16:25

## 2018-07-29 RX ADMIN — Medication SCH ECT: at 08:37

## 2018-07-29 RX ADMIN — Medication SCH ECT: at 08:24

## 2018-07-29 NOTE — PROGRESS NOTES
DATE:  



SUBJECTIVE:  Chart reviewed and the patient interviewed.  Also discussed the

patient's condition with the staff and reviewed records and labs.  The patient

is still forgetful and suspicious and seems to be paranoid.  The patient also

still has unpredictable behavior.  She also had difficulty sleeping at night and

she also refused to take Seroquel last night.  The patient also needs lots of

redirections.  Otherwise, the patient is trying to cooperate with the staff, but

still needs lots of redirections.



ASSESSMENT:  The patient is still psychotic.



TREATMENT PLAN:  Continue to monitor her behavior and her condition closely. 

Also, continue to work on her irritability and her agitation.





DD: 07/29/2018 17:20

DT: 07/29/2018 18:21

JOB# [id number]  [id number]

## 2018-07-29 NOTE — GENERAL PROGRESS NOTE
Subjective





- Review of Systems


Service Date: 18


Subjective: 





patient doing ok noted to have  elevated BP





Objective





- Results


Result Diagrams: 


 18 19:05





 18 19:05


Recent Labs: 


 Laboratory Last Values











WBC  6.8 Th/cmm (4.8-10.8)   18  19:05    


 


RBC  4.30 Mil/cmm (3.80-5.10)   18  19:05    


 


Hgb  13.0 gm/dL (12-16)   18  19:05    


 


Hct  39.0 % (41.0-60)  L  18  19:05    


 


MCV  90.6 fl ()   18  19:05    


 


MCH  30.3 pg (27.0-31.0)   18  19:05    


 


MCHC Differential  33.4 pg (28.0-36.0)   18  19:05    


 


RDW  13.0 % (11.5-20.0)   18  19:05    


 


Plt Count  179 Th/cmm (150-400)   18  19:05    


 


MPV  8.1 fl  18  19:05    


 


Neutrophils %  62.9 % (40.0-80.0)   18  19:05    


 


Lymphocytes %  24.6 % (20.0-50.0)   18  19:05    


 


Monocytes %  10.2 % (2.0-10.0)  H  18  19:05    


 


Eosinophils %  2.3 % (0.0-5.0)   18  19:05    


 


Basophils %  0.0 % (0.0-2.0)   18  19:05    


 


Sodium  133 mEq/L (136-145)  L  18  19:05    


 


Potassium  4.0 mEq/L (3.5-5.1)   18  19:05    


 


Chloride  99 mEq/L ()   18  19:05    


 


Carbon Dioxide  26.2 mEq/L (21.0-31.0)   18  19:05    


 


Anion Gap  11.8  (7.0-16.0)   18  19:05    


 


BUN  12 mg/dL (7-25)   18  19:05    


 


Creatinine  0.5 mg/dL (0.6-1.2)  L  18  19:05    


 


Est GFR ( Amer)  > 60.0 ml/min (>90)   18  19:05    


 


Est GFR (Non-Af Amer)  > 60.0 ml/min  18  19:05    


 


BUN/Creatinine Ratio  24.0   18  19:05    


 


Glucose  83 mg/dL ()   18  19:05    


 


Hemoglobin A1c %  5.2 % (4.0-6.0)   18  19:05    


 


Calcium  9.8 mg/dL (8.6-10.3)   18  19:05    


 


Total Bilirubin  0.5 mg/dL (0.3-1.0)   18  19:05    


 


AST  27 U/L (13-39)   18  19:05    


 


ALT  36 U/L (7-52)   18  19:05    


 


Alkaline Phosphatase  74 U/L ()   18  19:05    


 


Troponin I  < 0.01 ng/mL (0.01-0.05)  L  18  19:05    


 


Total Protein  6.3 gm/dL (6.0-8.3)   18  19:05    


 


Albumin  3.8 gm/dL (3.7-5.3)   18  19:05    


 


Globulin  2.5 gm/dL  18  19:05    


 


Albumin/Globulin Ratio  1.5  (1.0-1.8)   18  19:05    


 


Triglycerides  61 mg/dL (<150)   18  19:05    


 


Cholesterol  120 mg/dL (<200)   18  19:05    


 


LDL Cholesterol Direct  48 mg/dL ()  L  18  19:05    


 


HDL Cholesterol  53 mg/dL (23-92)   18  19:05    


 


TSH  1.19 uIU/ml (0.34-5.60)   18  19:05    


 


Urine Source  CLEAN C   18  19:43    


 


Urine Color  YELLOW   18  19:43    


 


Urine Clarity  CLEAR  (CLEAR)   18  19:43    


 


Urine pH  5.5  (4.6 - 8.0)   18  19:43    


 


Ur Specific Gravity  <= 1.005  (1.005-1.030)   18  19:43    


 


Urine Protein  NEGATIVE mg/dL (NEGATIVE)   18  19:43    


 


Urine Glucose (UA)  NEGATIVE mg/dL (NEGATIVE)   18  19:43    


 


Urine Ketones  NEGATIVE mg/dL (NEGATIVE)   18  19:43    


 


Urine Blood  NEGATIVE  (NEGATIVE)   18  19:43    


 


Urine Nitrate  NEGATIVE  (NEGATIVE)   18  19:43    


 


Urine Bilirubin  NEGATIVE  (NEGATIVE)   18  19:43    


 


Urine Urobilinogen  0.2 E.U./dL (0.2 - 1.0)   18  19:43    


 


Ur Leukocyte Esterase  NEGATIVE  (NEGATIVE)   18  19:43    


 


Urine RBC  NONE SEEN /hpf (0-5)   18  19:43    


 


Urine WBC  NONE SEEN /hpf (0-5)   18  19:43    


 


Ur Epithelial Cells  NONE SEEN /lpf (FEW)   18  19:43    


 


Urine Bacteria  NONE SEEN /hpf (NONE SEEN)   18  19:43    


 


Salicylates  < 25.0 mg/L (30.0-100.0)  L  18  19:05    


 


Urine Opiates Screen  NEGATIVE  (NEGATIVE)   18  19:43    


 


Urine Methadone Screen  NEGATIVE  (NEGATIVE)   18  19:43    


 


Acetaminophen  < 10.0 ug/mL (10.0-30.0)  L  18  19:05    


 


Ur Barbiturates Screen  NEGATIVE  (NEGATIVE)   18  19:43    


 


Ur Tricyclics Screen  POSITIVE  (NEGATIVE)  H  18  19:43    


 


Ur Phencyclidine Scrn  NEGATIVE  (NEGATIVE)   18  19:43    


 


Amphetamines Screen  NEGATIVE  (NEGATIVE)   18  19:43    


 


U Methamphetamines Scrn  NEGATIVE  (NEGATIVE)   18  19:43    


 


U Benzodiazepines Scrn  NEGATIVE  (NEGATIVE)   18  19:43    


 


U Cocaine Metab Screen  NEGATIVE  (NEGATIVE)   18  19:43    


 


U Cannabinoids Screen  NEGATIVE  (NEGATIVE)   18  19:43    


 


Ethyl Alcohol  < 10 mg/dL (0-10)   18  19:05    


 


RPR  NONREACTIVE  (NONREACTIVE)   18  19:05    














- Physical Exam


Vitals and I&O: 


 Vital Signs











Temp  97.6 F   18 08:00


 


Pulse  78   18 08:25


 


Resp  18   18 08:00


 


BP  150/79   18 08:25


 


Pulse Ox  98   18 08:00








 Intake & Output











 18





 18:59 06:59 18:59


 


Intake Total  2830 


 


Balance  2830 


 


Intake:   


 


  Oral  2830 


 


Other:   


 


  # Voids  2 


 


  # Bowel Movements  0 











Active Medications: 


Current Medications





Acetaminophen (Tylenol)  650 mg PO Q4HR PRN


   PRN Reason: Mild Pain / Temp above 101


   Stop: 18 23:05


Al Hydrox/Mg Hydrox/Simethicone (Maalox)  30 ml PO Q4HR PRN


   PRN Reason: GI DISTRESS


   Stop: 18 23:11


Bisacodyl (Dulcolax 10 Mg Supp)  10 mg RC DAILY PRN


   PRN Reason: IF MOM INEFFECTIVE


   Stop: 18 23:05


Carvedilol (Coreg)  6.25 mg PO BID [facility]


   Stop: 18 08:59


   Last Admin: 18 08:25 Dose:  6.25 mg


Clonazepam (Klonopin)  1.5 mg PO BID [facility]; Protocol


   Stop: 18 08:59


   Last Admin: 18 08:23 Dose:  1.5 mg


Diphenhydramine HCl (Benadryl)  50 mg PO HS [facility]


   Stop: 18 20:59


   Last Admin: 18 21:31 Dose:  50 mg


Docusate Sodium (Colace)  100 mg PO DAILY [facility]


   Stop: 18 08:59


   Last Admin: 18 08:24 Dose:  100 mg


Furosemide (Lasix)  40 mg PO DAILY [facility]


   Stop: 18 08:59


   Last Admin: 18 08:23 Dose:  40 mg


Hydroxyzine Pamoate (Vistaril)  25 mg PO Q4HR [facility]; Protocol


   Stop: 18 00:00


   Last Admin: 18 12:31 Dose:  Not Given


Magnesium Chloride (Slow-Mag)  1 ect PO DAILY [facility]


   Stop: 18 08:59


   Last Admin: 18 08:24 Dose:  1 ect


Magnesium Hydroxide (Milk Of Magnesia)  30 ml PO HS PRN


   PRN Reason: Constipation


   Stop: 18 23:05


Multivitamins/Vitamin C (Theragran)  1 tab PO DAILY [facility]


   Stop: 18 08:59


   Last Admin: 18 08:24 Dose:  1 tab


Quetiapine Fumarate (Seroquel)  200 mg PO BID [facility]; Protocol


   Stop: 18 08:59


   Last Admin: 18 08:24 Dose:  200 mg


Quetiapine Fumarate (Seroquel)  400 mg PO HS [facility]; Protocol


   Stop: 18 20:59


   Last Admin: 18 22:14 Dose:  Not Given


Risperidone (Risperdal)  2 mg PO BID [facility]; Protocol


   Stop: 18 08:59


   Last Admin: 18 08:24 Dose:  2 mg


Valproate Sodium (Depakene)  250 mg PO BID [facility]


   Stop: 18 08:59


   Last Admin: 18 08:24 Dose:  250 mg


Zolpidem Tartrate (Ambien)  5 mg PO PRN PRN


   PRN Reason: Insomnia


   Stop: 18 23:05


   Last Admin: 18 21:31 Dose:  5 mg








Cardiovascular: Regular rate


Lungs: Clear to auscultation





Assessment/Plan





- Problem List


Patient Problems: 


All Active Problems





INCREASED AGITATION/AGGRESION  (Acute) 











- Assessment


Assessment: 





HTN


Non compliance


Mental health disorder





- Plan


Plan: 





Monitor BP


Lisinopril added


Continue current BP meds


Medication compliance





Nutritional Asmnt/Malnutr-PDOC





- Dietary Evaluation


Malnutrition Findings (Please click <Entered> for more info): 








Nutritional Asmnt/Malnutrition                             Start:  18 14:

43


Text:                                                      Status: Complete    

  


Freq:                                                                          

  


Protocol:                                                                      

  


 Document     18 14:43  YESICA  (Rec: 18 14:48  YESICA  [id number])


 Nutritional Asmnt/Malnutrition


     Patient General Information


      Nutritional Screening                      Moderate Risk


      Diagnosis                                  psychosis NOS


      Pertinent Medical Hx/Surgical Hx           HTN, mental health disorders


      Subjective Information                     Pt seen in damion-chair in


                                                 hallway, confused. Per EMR, PO


                                                 intake 100% of meals.


      Current Diet Order/ Nutrition Support      [facility]


      Pertinent Medications                      colace, lasix, slow-mag,


                                                 theragran, seroquel,


      Pertinent Labs                              Na 133, Cr 0.5, glucose


                                                 83, A1c 5.2


     Nutritional Hx/Data


      Height                                     1.68 m


      Height (Calculated Centimeters)            167.6


      Current Weight (lbs)                       99.79 kg


      Weight (Calculated Kilograms)              99.8


      Weight (Calculated Grams)                  36671.3


      Ideal Body Weight                          130


      Body Mass Index (BMI)                      35.5


      Weight Status                              Obese


     GI Symptoms


      GI Symptoms                                None


      Last BM                                    none


      Difficult in:                              None


      Skin Integrity/Comment:                    javan score 16


      Current %PO                                Good (%)


     Estimated Nutritional Goals


      BEE in Kcals:                              Adj wt of IBW


      Calories/Kcals/Kg                          25-30


      Kcals Calculated                           [id number]


      Protein:                                   Adj wt of IBW


      Protein g/k


      Protein Calculated                         69


      Fluid: ml                                  1725-2070ml (1ml/kcal)


     Nutritional Problem


      No current Nutrition Prob


       Problem                                   N/A


     Malnutrition Alert


      Is there a minimum of two criteria         No


       selected?                                 


       Query Text:Check all the applicable       


       criteria. A minimum of two criteria are   


       recommended for diagnosis of either       


       severe or non-severe malnutrition.        


     Malnutrition Related to Morbid Obesity


      Malnutrition related to morbid obesity     No


     Intervention/Recommendation


      Comments                                   1. Continue with [facility] diet


                                                 as ordered.


                                                 2. Monitor PO intake, wt, labs


                                                 and skin integrity


                                                 3. F/U as low risk in 7 days,


                                                 


     Expected Outcomes/Goals


      Expected Outcomes/Goals                    1. PO intake to meet at least


                                                 75% of nutritional needs.


                                                 2. Wt stability, skin to


                                                 remain intact, labs to


                                                 approach WNL.

## 2018-07-29 NOTE — PROGRESS NOTES
DATE:  07/29/2018



Case was discussed with staff of the patient, reviewed records.  The patient

continues to be easily agitated, confused, unable to make safe plan for

self-care or participate in meaningful conversation.  Continues to have poor

insight, unpredictable, impulsive, needing redirection.  No side effects with

the medication, no sedation, no nausea, no extrapyramidal symptoms.  Work with

outpatient group therapy, milieu therapy, and adjust medications as needed.





DD: 07/29/2018 12:12

DT: 07/29/2018 21:30

JOB# [id number]  [id number]

## 2018-07-30 RX ADMIN — RISPERIDONE SCH MG: 4 TABLET ORAL at 17:32

## 2018-07-30 RX ADMIN — Medication SCH ECT: at 09:21

## 2018-07-30 RX ADMIN — RISPERIDONE SCH MG: 4 TABLET ORAL at 09:20

## 2018-07-31 RX ADMIN — RISPERIDONE SCH: 4 TABLET ORAL at 11:25

## 2018-07-31 RX ADMIN — Medication SCH: at 11:25

## 2018-07-31 NOTE — PROGRESS NOTES
DATE:  07/31/2018



PSYCHIATRIC PROGRESS NOTE



SUBJECTIVE:  Chart reviewed and the patient interviewed.  Also discussed the

patient's condition with the staff and reviewed the records and labs.  The

patient remains in angry and in irritable mood.  The patient also is suspicious

and paranoid.  The patient also is interacting minimally with others and when

she does, she is angry and restless.  She still needs close monitoring and she

still has poor impulse control.



Discussed with nursing staff, discharge plans and the patient still has no place

to go and still working on her discharge and placement issues.



We will continue monitoring her behavior and her medications and continue to

work on discharge plans and placement issue.





DD: 07/31/2018 17:54

DT: 07/31/2018 23:34

JOB# [id number]  [id number]

## 2018-08-01 RX ADMIN — RISPERIDONE SCH MG: 4 TABLET ORAL at 09:31

## 2018-08-01 RX ADMIN — Medication SCH ECT: at 09:31

## 2018-08-01 RX ADMIN — RISPERIDONE SCH MG: 4 TABLET ORAL at 16:32

## 2018-08-02 RX ADMIN — Medication SCH ECT: at 08:46

## 2018-08-02 NOTE — GENERAL PROGRESS NOTE
Subjective





- Review of Systems


Service Date: 18


Subjective: 





patient doing ok noted to have  elevated BP





Objective





- Results


Result Diagrams: 


 18 19:05





 18 19:05


Recent Labs: 


 Laboratory Last Values











WBC  6.8 Th/cmm (4.8-10.8)   18  19:05    


 


RBC  4.30 Mil/cmm (3.80-5.10)   18  19:05    


 


Hgb  13.0 gm/dL (12-16)   18  19:05    


 


Hct  39.0 % (41.0-60)  L  18  19:05    


 


MCV  90.6 fl ()   18  19:05    


 


MCH  30.3 pg (27.0-31.0)   18  19:05    


 


MCHC Differential  33.4 pg (28.0-36.0)   18  19:05    


 


RDW  13.0 % (11.5-20.0)   18  19:05    


 


Plt Count  179 Th/cmm (150-400)   18  19:05    


 


MPV  8.1 fl  18  19:05    


 


Neutrophils %  62.9 % (40.0-80.0)   18  19:05    


 


Lymphocytes %  24.6 % (20.0-50.0)   18  19:05    


 


Monocytes %  10.2 % (2.0-10.0)  H  18  19:05    


 


Eosinophils %  2.3 % (0.0-5.0)   18  19:05    


 


Basophils %  0.0 % (0.0-2.0)   18  19:05    


 


Sodium  133 mEq/L (136-145)  L  18  19:05    


 


Potassium  4.0 mEq/L (3.5-5.1)   18  19:05    


 


Chloride  99 mEq/L ()   18  19:05    


 


Carbon Dioxide  26.2 mEq/L (21.0-31.0)   18  19:05    


 


Anion Gap  11.8  (7.0-16.0)   18  19:05    


 


BUN  12 mg/dL (7-25)   18  19:05    


 


Creatinine  0.5 mg/dL (0.6-1.2)  L  18  19:05    


 


Est GFR ( Amer)  > 60.0 ml/min (>90)   18  19:05    


 


Est GFR (Non-Af Amer)  > 60.0 ml/min  18  19:05    


 


BUN/Creatinine Ratio  24.0   18  19:05    


 


Glucose  83 mg/dL ()   18  19:05    


 


Hemoglobin A1c %  5.2 % (4.0-6.0)   18  19:05    


 


Calcium  9.8 mg/dL (8.6-10.3)   18  19:05    


 


Total Bilirubin  0.5 mg/dL (0.3-1.0)   18  19:05    


 


AST  27 U/L (13-39)   18  19:05    


 


ALT  36 U/L (7-52)   18  19:05    


 


Alkaline Phosphatase  74 U/L ()   18  19:05    


 


Troponin I  < 0.01 ng/mL (0.01-0.05)  L  18  19:05    


 


Total Protein  6.3 gm/dL (6.0-8.3)   18  19:05    


 


Albumin  3.8 gm/dL (3.7-5.3)   18  19:05    


 


Globulin  2.5 gm/dL  18  19:05    


 


Albumin/Globulin Ratio  1.5  (1.0-1.8)   18  19:05    


 


Triglycerides  61 mg/dL (<150)   18  19:05    


 


Cholesterol  120 mg/dL (<200)   18  19:05    


 


LDL Cholesterol Direct  48 mg/dL ()  L  18  19:05    


 


HDL Cholesterol  53 mg/dL (23-92)   18  19:05    


 


TSH  1.19 uIU/ml (0.34-5.60)   18  19:05    


 


Urine Source  CLEAN C   18  19:43    


 


Urine Color  YELLOW   18  19:43    


 


Urine Clarity  CLEAR  (CLEAR)   18  19:43    


 


Urine pH  5.5  (4.6 - 8.0)   18  19:43    


 


Ur Specific Gravity  <= 1.005  (1.005-1.030)   18  19:43    


 


Urine Protein  NEGATIVE mg/dL (NEGATIVE)   18  19:43    


 


Urine Glucose (UA)  NEGATIVE mg/dL (NEGATIVE)   18  19:43    


 


Urine Ketones  NEGATIVE mg/dL (NEGATIVE)   18  19:43    


 


Urine Blood  NEGATIVE  (NEGATIVE)   18  19:43    


 


Urine Nitrate  NEGATIVE  (NEGATIVE)   18  19:43    


 


Urine Bilirubin  NEGATIVE  (NEGATIVE)   18  19:43    


 


Urine Urobilinogen  0.2 E.U./dL (0.2 - 1.0)   18  19:43    


 


Ur Leukocyte Esterase  NEGATIVE  (NEGATIVE)   18  19:43    


 


Urine RBC  NONE SEEN /hpf (0-5)   18  19:43    


 


Urine WBC  NONE SEEN /hpf (0-5)   18  19:43    


 


Ur Epithelial Cells  NONE SEEN /lpf (FEW)   18  19:43    


 


Urine Bacteria  NONE SEEN /hpf (NONE SEEN)   18  19:43    


 


Salicylates  < 25.0 mg/L (30.0-100.0)  L  18  19:05    


 


Urine Opiates Screen  NEGATIVE  (NEGATIVE)   18  19:43    


 


Urine Methadone Screen  NEGATIVE  (NEGATIVE)   18  19:43    


 


Acetaminophen  < 10.0 ug/mL (10.0-30.0)  L  18  19:05    


 


Ur Barbiturates Screen  NEGATIVE  (NEGATIVE)   18  19:43    


 


Ur Tricyclics Screen  POSITIVE  (NEGATIVE)  H  18  19:43    


 


Ur Phencyclidine Scrn  NEGATIVE  (NEGATIVE)   18  19:43    


 


Amphetamines Screen  NEGATIVE  (NEGATIVE)   18  19:43    


 


U Methamphetamines Scrn  NEGATIVE  (NEGATIVE)   18  19:43    


 


U Benzodiazepines Scrn  NEGATIVE  (NEGATIVE)   18  19:43    


 


U Cocaine Metab Screen  NEGATIVE  (NEGATIVE)   18  19:43    


 


U Cannabinoids Screen  NEGATIVE  (NEGATIVE)   18  19:43    


 


Ethyl Alcohol  < 10 mg/dL (0-10)   18  19:05    


 


RPR  NONREACTIVE  (NONREACTIVE)   18  19:05    














- Physical Exam


Vitals and I&O: 


 Vital Signs











Temp  98.2 F   18 14:00


 


Pulse  80   18 14:00


 


Resp  18   18 14:00


 


BP  153/90   18 14:00


 


Pulse Ox  95   18 14:00








 Intake & Output











 18





 18:59 06:59 18:59


 


Intake Total 1500 120 


 


Balance 1500 120 


 


Intake:   


 


  Oral 1500 120 


 


Other:   


 


  # Voids 3 2 


 


  # Bowel Movements 0  











Active Medications: 


Current Medications





Acetaminophen (Tylenol)  650 mg PO Q4HR PRN


   PRN Reason: Mild Pain / Temp above 101


   Stop: 18 23:05


   Last Admin: 18 03:38 Dose:  650 mg


Al Hydrox/Mg Hydrox/Simethicone (Maalox)  30 ml PO Q4HR PRN


   PRN Reason: GI DISTRESS


   Stop: 18 23:11


Bisacodyl (Dulcolax 10 Mg Supp)  10 mg RC DAILY PRN


   PRN Reason: IF MOM INEFFECTIVE


   Stop: 18 23:05


Carvedilol (Coreg)  6.25 mg PO BID [facility]


   Stop: 18 08:59


   Last Admin: 18 09:54 Dose:  6.25 mg


Clonazepam (Klonopin)  1.5 mg PO BID [facility]; Protocol


   Stop: 18 08:59


   Last Admin: 18 10:12 Dose:  1.5 mg


Diphenhydramine HCl (Benadryl)  50 mg PO HS [facility]


   Stop: 18 20:59


   Last Admin: 18 22:21 Dose:  Not Given


Docusate Sodium (Colace)  100 mg PO DAILY [facility]


   Stop: 18 08:59


   Last Admin: 18 08:47 Dose:  100 mg


Furosemide (Lasix)  40 mg PO DAILY [facility]


   Stop: 18 08:59


   Last Admin: 18 09:20 Dose:  40 mg


Hydroxyzine Pamoate (Vistaril)  25 mg PO Q4HR [facility]; Protocol


   Stop: 18 00:00


   Last Admin: 18 13:54 Dose:  25 mg


Lisinopril (Zestril)  10 mg PO DAILY [facility]


   Stop: 18 08:59


   Last Admin: 18 09:21 Dose:  10 mg


Magnesium Chloride (Slow-Mag)  1 ect PO DAILY [facility]


   Stop: 18 08:59


   Last Admin: 18 08:46 Dose:  1 ect


Magnesium Hydroxide (Milk Of Magnesia)  30 ml PO HS PRN


   PRN Reason: Constipation


   Stop: 18 23:05


Multivitamins/Vitamin C (Theragran)  1 tab PO DAILY [facility]


   Stop: 18 08:59


   Last Admin: 18 08:42 Dose:  1 tab


Quetiapine Fumarate (Seroquel)  200 mg PO BID [facility]; Protocol


   Stop: 18 08:59


   Last Admin: 18 09:27 Dose:  200 mg


Quetiapine Fumarate (Seroquel)  400 mg PO HS [facility]; Protocol


   Stop: 18 20:59


   Last Admin: 18 22:21 Dose:  Not Given


Risperidone (Risperdal)  1 mg PO BID [facility]; Protocol


   Stop: 10/01/18 08:14


   Last Admin: 18 09:27 Dose:  1 mg


Valproate Sodium (Depakene)  250 mg PO BID [facility]


   Stop: 18 08:59


   Last Admin: 18 09:27 Dose:  250 mg


Zolpidem Tartrate (Ambien)  5 mg PO PRN PRN


   PRN Reason: Insomnia


   Stop: 18 23:05


   Last Admin: 18 01:41 Dose:  5 mg








Cardiovascular: Regular rate


Lungs: Clear to auscultation





Assessment/Plan





- Problem List


Patient Problems: 


All Active Problems





INCREASED AGITATION/AGGRESION  (Acute) 











- Assessment


Assessment: 





HTN


Non compliance


Mental health disorder





- Plan


Plan: 





Monitor BP


Lisinopril 


psych management per psych


Medication compliance





Nutritional Asmnt/Malnutr-PDOC





- Dietary Evaluation


Malnutrition Findings (Please click <Entered> for more info): 








Nutritional Asmnt/Malnutrition                             Start:  18 14:

43


Text:                                                      Status: Complete    

  


Freq:                                                                          

  


Protocol:                                                                      

  


 Document     18 14:43  YESICA  (Rec: 18 14:48  YESICA  [id number])


 Nutritional Asmnt/Malnutrition


     Patient General Information


      Nutritional Screening                      Moderate Risk


      Diagnosis                                  psychosis NOS


      Pertinent Medical Hx/Surgical Hx           HTN, mental health disorders


      Subjective Information                     [facility] Per EMR, PO


                                                 intake 100% of meals.


      Current Diet Order/ Nutrition Support      [facility]


      Pertinent Medications                      colace, lasix, slow-mag,


                                                 theragran, seroquel,


      Pertinent Labs                              Na 133, Cr 0.5, glucose


                                                 83, A1c 5.2


     Nutritional Hx/Data


      Height                                     1.68 m


      Height (Calculated Centimeters)            167.6


      Current Weight (lbs)                       99.79 kg


      Weight (Calculated Kilograms)              99.8


      Weight (Calculated Grams)                  96736.3


      Ideal Body Weight                          130


      Body Mass Index (BMI)                      35.5


      Weight Status                              Obese


     GI Symptoms


      GI Symptoms                                None


      Last BM                                    none


      Difficult in:                              None


      Skin Integrity/Comment:                    javan score 16


      Current %PO                                Good (%)


     Estimated Nutritional Goals


      BEE in Kcals:                              Adj wt of IBW


      Calories/Kcals/Kg                          25-30


      Kcals Calculated                           4650-5220


      Protein:                                   Adj wt of IBW


      Protein g/k


      Protein Calculated                         69


      Fluid: ml                                  1725-2070ml (1ml/kcal)


     Nutritional Problem


      No current Nutrition Prob


       Problem                                   N/A


     Malnutrition Alert


      Is there a minimum of two criteria         No


       selected?                                 


       Query Text:Check all the applicable       


       criteria. A minimum of two criteria are   


       recommended for diagnosis of either       


       severe or non-severe malnutrition.        


     Malnutrition Related to Morbid Obesity


      Malnutrition related to morbid obesity     No


     Intervention/Recommendation


      Comments                                   1. Continue with [facility] diet


                                                 as ordered.


                                                 2. Monitor PO intake, wt, labs


                                                 and skin integrity


                                                 3. F/U as low risk in 7 days,


                                                 


     Expected Outcomes/Goals


      Expected Outcomes/Goals                    1. PO intake to meet at least


                                                 75% of nutritional needs.


                                                 2. Wt stability, skin to


                                                 remain intact, labs to


                                                 approach WNL.

## 2018-08-03 RX ADMIN — Medication SCH ECT: at 08:25

## 2018-08-04 NOTE — PROGRESS NOTES
DATE:  08/01/2018



DATE:  08/01/2018



SUBJECTIVE:  Chart reviewed and the patient interviewed.  Also discussed the

patient's condition with the staff and reviewed records and labs.  The patient

is still confused and is still easily agitated and in irritable mood.  The

patient also is still suspicious and is still angry at times.  She also still

needs lots of redirections.  She also is still restless.  Otherwise, the patient

is more compliant with taking her medications with no side effects of

medications.



ASSESSMENT:  The patient is still agitated and psychotic.



TREATMENT PLAN:  Continue to monitor her behavior and her condition closely. 

Also, continue adjusting psychotropic medications and working on discharge

plans.





DD: 08/03/2018 18:33

DT: 08/04/2018 09:54

JOB# [id number]  [id number]

## 2018-08-04 NOTE — PROGRESS NOTES
DATE:  08/02/2018



SUBJECTIVE:  Chart reviewed and the patient interviewed.  Also discussed the

patient's condition with the staff and reviewed records and labs.  The patient

is still easily agitated and in irritable mood.  The patient also is still

unpredictable and still has severe mood swings and anger.  The patient also is

guarded.  She has continued to take Klonopin in 1.5 mg twice a day and Seroquel

200 mg twice a day and 400 mg at bedtime.  She is also taking Risperdal 1 mg

twice a day.  She still needs lots of redirections.  She also still has trouble

sleeping at night.



TREATMENT PLAN:  We will continue monitoring her behavior and continue to follow

up.





DD: 08/03/2018 21:15

DT: 08/04/2018 18:59

[facility]# [id number]  [id number]

## 2018-08-04 NOTE — DISCHARGE SUMMARY
DATE OF DISCHARGE:  08/03/2018



PATIENT'S AGE:  57.



SEX:  Female.



PHYSICIAN:  Binaka Basurto MD, MPH



FINAL DIAGNOSES:

PRIMARY DIAGNOSES:  Schizoaffective disorder, bipolar type, severe, with

psychotic features.



REASON FOR HOSPITALIZATION:  The patient was admitted to the hospital because of

increased irritability and increased agitation.



The patient was originally coming from [facility]. 

She was aggressive and she was not able to follow any of staff directions and

she was admitted to the hospital.



HOSPITAL COURSE:  The patient continued to be increasingly irritable and

increasingly agitated.  She also was in depressed mood and at other times she

was hyperverbal.  The patient was given Klonopin and the dose increased to 1.5

mg twice a day.  She also was given Seroquel in a dose of 200 mg twice a day and

400 mg at the time and Seroquel was added and the dose adjusted to 2 mg twice a

day and also she was given Depakote 250 mg twice a day.  Gradually, the

patient's affect was brighter.  Placement was an issue.  Finally[facility] patient and the patient was transferred there.



Physical exam of the patient showed that the patient has hypertension.  Dr. Tucker will observe the patient closely.



AFTER DISCHARGE PLANS:  The patient discharged from the hospital, went to the

_____.  Plan to follow up there.



EXPECTED OUTCOME AFTER DISCHARGE:  Fair if the patient continues with her

outpatient treatment plans and to take her psychotropic medications.





DD: 08/03/2018 21:47

DT: 08/04/2018 00:01

[facility]# [id number]  [id number]

## 2023-01-17 NOTE — PROGRESS NOTES
DATE:  05/31/2018



SUBJECTIVE:  Chart reviewed and the patient interviewed.  Also discussed the

patient's condition with the staff and reviewed records and labs.  The patient

continued to be confused and continued to be manicky and at times singing and

other times yelling and screaming.  Unpredictable behavior continued.  The

patient also has been focused on food and smoking.  The patient also still has

difficulty following directions and gets agitated when staff tries to redirect

her.  Otherwise, the patient continued to comply with taking her medications

with no side effects of medications.



ASSESSMENT:  The patient is still manicky and psychotic.



TREATMENT PLAN:  We will continue monitoring her behavior and her condition

closely.  Also, we will increase Seroquel to 150 mg twice a day and 300 mg at

bedtime.  Depakote blood level was done on 05/16/2018, came back to be _____ and

will repeat Depakote blood level today.





DD: 06/01/2018 05:19

DT: 06/01/2018 14:50

JOB# [id number]  [id number] Hi!  Yes, please schedule her for earlier visit, overbook if necessary. Thank you!

## 2023-07-05 NOTE — PROGRESS NOTES
Called patient and rescheduled appointment   DATE:  05/19/2018



Case was discussed with staff of the patient, reviewed records.  Covering for

Dr. Basurto.  This is a 57-year-old female who was admitted on 05/15/2018.  The

patient with a history of bipolar disorder, very agitated, restless for a few

days prior to admission, has not been able to follow staff direction.  She has

no idea why she was hospitalized with a history of bipolar disorder and multiple

psychiatric hospitalizations with history of hypertension with cardiomegaly. 

The patient continues to be very agitated.  Apparently, she has been coming out

of her room yelling and screaming for no apparent reason, very agitated,

irritable, demanding, acting inappropriately, bizarre, unable to follow

directions.  She has been compliant with the medication with no side effects, no

sedation, no nausea, and no extrapyramidal symptoms.  Her current medication is

Coreg 6.25 mg twice a day, Lasix 20 mg daily, multivitamin 1 tablet daily,

nystatin 100,000 unit packet every 4 weeks, Seroquel 50 mg twice a day and 200

mg at bedtime, Depakote 500 mg twice a day, Ambien 5 mg at bedtime as needed for

lack of sleep and medication was adjusted yesterday by Dr. Basurto and increased

Seroquel 200 mg at bedtime.  She was too early to make further adjustments.  We

will continue outpatient group therapy, milieu therapy, and adjust the

medications as needed.





DD: 05/19/2018 12:50

DT: 05/19/2018 21:36

JOB# [id number]  [id number]